# Patient Record
Sex: MALE | Race: WHITE
[De-identification: names, ages, dates, MRNs, and addresses within clinical notes are randomized per-mention and may not be internally consistent; named-entity substitution may affect disease eponyms.]

---

## 2018-06-01 ENCOUNTER — HOSPITAL ENCOUNTER (EMERGENCY)
Dept: HOSPITAL 8 - ED | Age: 58
Discharge: HOME | End: 2018-06-01
Payer: COMMERCIAL

## 2018-06-01 VITALS — HEIGHT: 68 IN | BODY MASS INDEX: 27.16 KG/M2 | WEIGHT: 179.24 LBS

## 2018-06-01 VITALS — DIASTOLIC BLOOD PRESSURE: 79 MMHG | SYSTOLIC BLOOD PRESSURE: 156 MMHG

## 2018-06-01 DIAGNOSIS — T18.128A: Primary | ICD-10-CM

## 2018-06-01 PROCEDURE — 88305 TISSUE EXAM BY PATHOLOGIST: CPT

## 2018-06-01 PROCEDURE — 43247 EGD REMOVE FOREIGN BODY: CPT

## 2018-06-01 PROCEDURE — 99285 EMERGENCY DEPT VISIT HI MDM: CPT

## 2018-06-01 PROCEDURE — 96374 THER/PROPH/DIAG INJ IV PUSH: CPT

## 2023-08-10 ENCOUNTER — OFFICE VISIT (OUTPATIENT)
Dept: MEDICAL GROUP | Facility: PHYSICIAN GROUP | Age: 63
End: 2023-08-10
Payer: COMMERCIAL

## 2023-08-10 VITALS
SYSTOLIC BLOOD PRESSURE: 174 MMHG | TEMPERATURE: 98.2 F | WEIGHT: 200.4 LBS | HEIGHT: 68 IN | BODY MASS INDEX: 30.37 KG/M2 | DIASTOLIC BLOOD PRESSURE: 100 MMHG | HEART RATE: 107 BPM

## 2023-08-10 DIAGNOSIS — R19.7 DIARRHEA, UNSPECIFIED TYPE: ICD-10-CM

## 2023-08-10 DIAGNOSIS — R53.83 OTHER FATIGUE: ICD-10-CM

## 2023-08-10 DIAGNOSIS — I10 PRIMARY HYPERTENSION: ICD-10-CM

## 2023-08-10 DIAGNOSIS — R63.0 POOR APPETITE: ICD-10-CM

## 2023-08-10 DIAGNOSIS — R11.0 NAUSEA: ICD-10-CM

## 2023-08-10 DIAGNOSIS — Z00.00 ENCOUNTER FOR MEDICAL EXAMINATION TO ESTABLISH CARE: ICD-10-CM

## 2023-08-10 DIAGNOSIS — Z12.11 ENCOUNTER FOR SCREENING FOR MALIGNANT NEOPLASM OF COLON: ICD-10-CM

## 2023-08-10 DIAGNOSIS — E66.9 OBESITY (BMI 30-39.9): ICD-10-CM

## 2023-08-10 DIAGNOSIS — Z11.59 ENCOUNTER FOR HEPATITIS C SCREENING TEST FOR LOW RISK PATIENT: ICD-10-CM

## 2023-08-10 DIAGNOSIS — Z23 NEED FOR VACCINATION: ICD-10-CM

## 2023-08-10 DIAGNOSIS — R14.0 BLOATED ABDOMEN: ICD-10-CM

## 2023-08-10 PROCEDURE — 3077F SYST BP >= 140 MM HG: CPT

## 2023-08-10 PROCEDURE — 90471 IMMUNIZATION ADMIN: CPT

## 2023-08-10 PROCEDURE — 90715 TDAP VACCINE 7 YRS/> IM: CPT

## 2023-08-10 PROCEDURE — 99204 OFFICE O/P NEW MOD 45 MIN: CPT | Mod: 25

## 2023-08-10 PROCEDURE — 3080F DIAST BP >= 90 MM HG: CPT

## 2023-08-10 RX ORDER — DICYCLOMINE HYDROCHLORIDE 10 MG/1
10 CAPSULE ORAL
Qty: 120 CAPSULE | Refills: 0 | Status: SHIPPED | OUTPATIENT
Start: 2023-08-10 | End: 2023-09-07

## 2023-08-10 RX ORDER — AMLODIPINE BESYLATE 5 MG/1
5 TABLET ORAL DAILY
Qty: 90 TABLET | Refills: 0 | Status: SHIPPED | OUTPATIENT
Start: 2023-08-10 | End: 2023-12-14 | Stop reason: SDUPTHER

## 2023-08-10 ASSESSMENT — PATIENT HEALTH QUESTIONNAIRE - PHQ9
CLINICAL INTERPRETATION OF PHQ2 SCORE: 5
5. POOR APPETITE OR OVEREATING: 3 - NEARLY EVERY DAY
SUM OF ALL RESPONSES TO PHQ QUESTIONS 1-9: 12

## 2023-08-10 NOTE — PROGRESS NOTES
"Subjective:     CC:    Chief Complaint   Patient presents with    Diarrhea     X6-8 months     Bloated     X6-8 months     Fatigue     X6-8 months    Nausea     HISTORY OF THE PRESENT ILLNESS: Morales is a pleasant 62 y.o. male here today to establish care and discuss 6-8 months of GI symptoms including diarrhea, bloating , nausea and fatigue.   As a result, patient states he doesn't have an appetite. He has early satiety. He feels he has to practically force feed himself. He denies weight loss.   On average, he has about 4 episodes of diarrhea every day.  He denies abdominal pain or burning.  He does not have any improvement in symptoms when he eats.    He feels he has no energy.  He does not feel he's drinking enough water.    He does drink alcohol, about 4 drinks of whiskey and 7-up a day.  He chews tobacco.    Health Maintenance: Completed    ROS:   All systems negative except as addressed in assessment and plan.     Objective:     Exam: BP (!) 174/100 (BP Location: Left arm, Patient Position: Sitting, BP Cuff Size: Adult)   Pulse (!) 107   Temp 36.8 °C (98.2 °F) (Temporal)   Ht 1.727 m (5' 8\")   Wt 90.9 kg (200 lb 6.4 oz)  Body mass index is 30.47 kg/m².    Physical Exam  Cardiovascular:      Rate and Rhythm: Tachycardia present.   Pulmonary:      Effort: Pulmonary effort is normal.      Breath sounds: Normal breath sounds.   Abdominal:      General: There is distension.   Musculoskeletal:         General: Normal range of motion.   Skin:     Comments: +Flushed skin       Labs: No labs on file to review      Assessment & Plan:   62 y.o. male with the following -    1. Encounter for medical examination to establish care  Health conditions and medications reviewed and updated. All screenings discussed and up-to-date. Health maintenance completed.     - TSH WITH REFLEX TO FT4; Future  - VITAMIN D,25 HYDROXY (DEFICIENCY); Future  - Lipid Profile; Future  - Comp Metabolic Panel; Future  - CBC WITH DIFFERENTIAL; " Future  - CBC WITHOUT DIFFERENTIAL; Future  - VITAMIN D,25 HYDROXY (DEFICIENCY); Future    2. Other fatigue  3. Diarrhea, unspecified type  4. Nausea  5. Bloated abdomen  6. Poor appetite  This is a new problem to examiner, chronic to patient.  Patient does admit to being a heavy drinker.  Will review labs as well as stool studies.  Referral also placed to GI for upper and lower scope.  Will order dicyclomine for symptomatic relief.  Patient to return in 2 weeks for follow-up.  - TSH WITH REFLEX TO FT4; Future  - VITAMIN D,25 HYDROXY (DEFICIENCY); Future  - CBC WITHOUT DIFFERENTIAL; Future  - HIV AG/AB COMBO ASSAY SCREENING; Future  - VITAMIN B12; Future  - FERRITIN; Future  - IRON/TOTAL IRON BIND; Future  - CELIAC DISEASE AB PANEL; Future  - Sed Rate; Future  - CRP QUANTITIVE (NON-CARDIAC); Future  - HEMOGLOBIN A1C; Future  - VITAMIN D,25 HYDROXY (DEFICIENCY); Future    7. Obesity (BMI 30-39.9)  - TSH WITH REFLEX TO FT4; Future  - Lipid Profile; Future  - Comp Metabolic Panel; Future  - HEMOGLOBIN A1C; Future    8. Primary hypertension  New problem.    Blood pressure in clinic is 174/100.  Will start patient on amlodipine.  He does have tachycardia with a heart rate of 107 as well today.  Will consider adding a beta-blocker at next visit if this continues to be problematic.  - amLODIPine (NORVASC) 5 MG Tab; Take 1 Tablet by mouth every day.  Dispense: 90 Tablet; Refill: 0    9. Encounter for hepatitis C screening test for low risk patient  - HEP C VIRUS ANTIBODY; Future    10. Encounter for screening for malignant neoplasm of colon  - Referral to GI for Colonoscopy    11. Need for vaccination  - Zoster Vac Recomb Adjuvanted (SHINGRIX) 50 MCG/0.5ML Recon Susp; Inject 0.5 mL into the shoulder, thigh, or buttocks one time for 1 dose.  Dispense: 0.5 mL; Refill: 0  - Tdap Vaccine =>6YO IM    Patient was educated in proper administration of medication(s) ordered today including safety, possible SE, risks, benefits,  rationale and alternatives to therapy.   Supportive care, differential diagnoses, and indications for immediate follow-up discussed with patient.    Pathogenesis of diagnosis discussed including typical length and natural progression.    Instructed to return to clinic or nearest emergency department for any change in condition, further concerns, or worsening of symptoms.  Patient states understanding of the plan of care and discharge instructions.    Return in about 2 weeks (around 8/24/2023) for Follow-up BP, Lab Review.    I spent a total of 42 minutes with record review, exam, and communication with the patient, communication with other providers, and documentation of this encounter. This does not include time spent on separately billable procedures/tests.    I have placed the above orders and discussed them with an approved delegating provider.  The MA is performing the below orders under the direction of Dr. Calvin.    Please note that this dictation was created using voice recognition software. I have worked with consultants from the vendor as well as technical experts from [facility] to optimize the interface. I have made every reasonable attempt to correct obvious errors, but I expect that there are errors of grammar and possibly content that I did not discover before finalizing the note.

## 2023-08-15 ENCOUNTER — HOSPITAL ENCOUNTER (OUTPATIENT)
Dept: LAB | Facility: MEDICAL CENTER | Age: 63
End: 2023-08-15
Payer: COMMERCIAL

## 2023-08-15 DIAGNOSIS — Z00.00 ENCOUNTER FOR MEDICAL EXAMINATION TO ESTABLISH CARE: ICD-10-CM

## 2023-08-15 DIAGNOSIS — R14.0 BLOATED ABDOMEN: ICD-10-CM

## 2023-08-15 DIAGNOSIS — R19.7 DIARRHEA, UNSPECIFIED TYPE: ICD-10-CM

## 2023-08-15 DIAGNOSIS — R11.0 NAUSEA: ICD-10-CM

## 2023-08-15 DIAGNOSIS — E66.9 OBESITY (BMI 30-39.9): ICD-10-CM

## 2023-08-15 DIAGNOSIS — R53.83 OTHER FATIGUE: ICD-10-CM

## 2023-08-15 DIAGNOSIS — Z11.59 ENCOUNTER FOR HEPATITIS C SCREENING TEST FOR LOW RISK PATIENT: ICD-10-CM

## 2023-08-15 LAB
25(OH)D3 SERPL-MCNC: 17 NG/ML (ref 30–100)
ALBUMIN SERPL BCP-MCNC: 4.6 G/DL (ref 3.2–4.9)
ALBUMIN/GLOB SERPL: 1.3 G/DL
ALP SERPL-CCNC: 138 U/L (ref 30–99)
ALT SERPL-CCNC: 102 U/L (ref 2–50)
ANION GAP SERPL CALC-SCNC: 14 MMOL/L (ref 7–16)
AST SERPL-CCNC: 190 U/L (ref 12–45)
BASOPHILS # BLD AUTO: 0.8 % (ref 0–1.8)
BASOPHILS # BLD: 0.06 K/UL (ref 0–0.12)
BILIRUB SERPL-MCNC: 1.2 MG/DL (ref 0.1–1.5)
BUN SERPL-MCNC: 6 MG/DL (ref 8–22)
CALCIUM ALBUM COR SERPL-MCNC: 8.8 MG/DL (ref 8.5–10.5)
CALCIUM SERPL-MCNC: 9.3 MG/DL (ref 8.5–10.5)
CHLORIDE SERPL-SCNC: 101 MMOL/L (ref 96–112)
CHOLEST SERPL-MCNC: 230 MG/DL (ref 100–199)
CO2 SERPL-SCNC: 24 MMOL/L (ref 20–33)
CREAT SERPL-MCNC: 0.64 MG/DL (ref 0.5–1.4)
CRP SERPL HS-MCNC: 1.38 MG/DL (ref 0–0.75)
EOSINOPHIL # BLD AUTO: 0.23 K/UL (ref 0–0.51)
EOSINOPHIL NFR BLD: 3.2 % (ref 0–6.9)
ERYTHROCYTE [DISTWIDTH] IN BLOOD BY AUTOMATED COUNT: 49.1 FL (ref 35.9–50)
ERYTHROCYTE [SEDIMENTATION RATE] IN BLOOD BY WESTERGREN METHOD: 11 MM/HOUR (ref 0–20)
EST. AVERAGE GLUCOSE BLD GHB EST-MCNC: 91 MG/DL
FERRITIN SERPL-MCNC: 1073 NG/ML (ref 22–322)
GFR SERPLBLD CREATININE-BSD FMLA CKD-EPI: 107 ML/MIN/1.73 M 2
GLOBULIN SER CALC-MCNC: 3.5 G/DL (ref 1.9–3.5)
GLUCOSE SERPL-MCNC: 109 MG/DL (ref 65–99)
H PYLORI AG STL QL IA: NOT DETECTED
HBA1C MFR BLD: 4.8 % (ref 4–5.6)
HCT VFR BLD AUTO: 45.5 % (ref 42–52)
HCV AB SER QL: NORMAL
HDLC SERPL-MCNC: 43 MG/DL
HGB BLD-MCNC: 16.4 G/DL (ref 14–18)
HIV 1+2 AB+HIV1 P24 AG SERPL QL IA: NORMAL
IMM GRANULOCYTES # BLD AUTO: 0.06 K/UL (ref 0–0.11)
IMM GRANULOCYTES NFR BLD AUTO: 0.8 % (ref 0–0.9)
IRON SATN MFR SERPL: 48 % (ref 15–55)
IRON SERPL-MCNC: 127 UG/DL (ref 50–180)
LDLC SERPL CALC-MCNC: 156 MG/DL
LYMPHOCYTES # BLD AUTO: 1.26 K/UL (ref 1–4.8)
LYMPHOCYTES NFR BLD: 17.7 % (ref 22–41)
MCH RBC QN AUTO: 35.8 PG (ref 27–33)
MCHC RBC AUTO-ENTMCNC: 36 G/DL (ref 32.3–36.5)
MCV RBC AUTO: 99.3 FL (ref 81.4–97.8)
MONOCYTES # BLD AUTO: 0.67 K/UL (ref 0–0.85)
MONOCYTES NFR BLD AUTO: 9.4 % (ref 0–13.4)
NEUTROPHILS # BLD AUTO: 4.85 K/UL (ref 1.82–7.42)
NEUTROPHILS NFR BLD: 68.1 % (ref 44–72)
NRBC # BLD AUTO: 0 K/UL
NRBC BLD-RTO: 0 /100 WBC (ref 0–0.2)
PLATELET # BLD AUTO: 119 K/UL (ref 164–446)
PMV BLD AUTO: 11.7 FL (ref 9–12.9)
POTASSIUM SERPL-SCNC: 3.2 MMOL/L (ref 3.6–5.5)
PROT SERPL-MCNC: 8.1 G/DL (ref 6–8.2)
RBC # BLD AUTO: 4.58 M/UL (ref 4.7–6.1)
SODIUM SERPL-SCNC: 139 MMOL/L (ref 135–145)
TIBC SERPL-MCNC: 264 UG/DL (ref 250–450)
TRIGL SERPL-MCNC: 154 MG/DL (ref 0–149)
TSH SERPL DL<=0.005 MIU/L-ACNC: 2.27 UIU/ML (ref 0.38–5.33)
UIBC SERPL-MCNC: 137 UG/DL (ref 110–370)
VIT B12 SERPL-MCNC: 831 PG/ML (ref 211–911)
WBC # BLD AUTO: 7.1 K/UL (ref 4.8–10.8)

## 2023-08-15 PROCEDURE — 36415 COLL VENOUS BLD VENIPUNCTURE: CPT

## 2023-08-15 PROCEDURE — 82728 ASSAY OF FERRITIN: CPT

## 2023-08-15 PROCEDURE — 82784 ASSAY IGA/IGD/IGG/IGM EACH: CPT

## 2023-08-15 PROCEDURE — 86364 TISS TRNSGLTMNASE EA IG CLAS: CPT

## 2023-08-15 PROCEDURE — 83036 HEMOGLOBIN GLYCOSYLATED A1C: CPT

## 2023-08-15 PROCEDURE — 80053 COMPREHEN METABOLIC PANEL: CPT

## 2023-08-15 PROCEDURE — 87338 HPYLORI STOOL AG IA: CPT

## 2023-08-15 PROCEDURE — 84443 ASSAY THYROID STIM HORMONE: CPT

## 2023-08-15 PROCEDURE — 87389 HIV-1 AG W/HIV-1&-2 AB AG IA: CPT

## 2023-08-15 PROCEDURE — 82607 VITAMIN B-12: CPT

## 2023-08-15 PROCEDURE — 85652 RBC SED RATE AUTOMATED: CPT

## 2023-08-15 PROCEDURE — 85025 COMPLETE CBC W/AUTO DIFF WBC: CPT

## 2023-08-15 PROCEDURE — 83550 IRON BINDING TEST: CPT

## 2023-08-15 PROCEDURE — 86140 C-REACTIVE PROTEIN: CPT

## 2023-08-15 PROCEDURE — 82306 VITAMIN D 25 HYDROXY: CPT

## 2023-08-15 PROCEDURE — 86803 HEPATITIS C AB TEST: CPT

## 2023-08-15 PROCEDURE — 83540 ASSAY OF IRON: CPT

## 2023-08-15 PROCEDURE — 80061 LIPID PANEL: CPT

## 2023-08-17 LAB
IGA SERPL-MCNC: 533 MG/DL (ref 68–408)
TTG IGA SER IA-ACNC: 2 U/ML (ref 0–3)

## 2023-09-01 ENCOUNTER — OFFICE VISIT (OUTPATIENT)
Dept: MEDICAL GROUP | Facility: PHYSICIAN GROUP | Age: 63
End: 2023-09-01
Payer: COMMERCIAL

## 2023-09-01 VITALS
HEIGHT: 68 IN | HEART RATE: 108 BPM | WEIGHT: 200.6 LBS | DIASTOLIC BLOOD PRESSURE: 94 MMHG | OXYGEN SATURATION: 96 % | SYSTOLIC BLOOD PRESSURE: 142 MMHG | BODY MASS INDEX: 30.4 KG/M2 | TEMPERATURE: 97.2 F

## 2023-09-01 DIAGNOSIS — F10.29 ALCOHOL DEPENDENCE WITH UNSPECIFIED ALCOHOL-INDUCED DISORDER (HCC): ICD-10-CM

## 2023-09-01 DIAGNOSIS — E55.9 VITAMIN D DEFICIENCY: ICD-10-CM

## 2023-09-01 DIAGNOSIS — R00.0 TACHYCARDIA: ICD-10-CM

## 2023-09-01 DIAGNOSIS — I10 PRIMARY HYPERTENSION: ICD-10-CM

## 2023-09-01 DIAGNOSIS — R63.0 POOR APPETITE: ICD-10-CM

## 2023-09-01 DIAGNOSIS — R79.89 ELEVATED LFTS: ICD-10-CM

## 2023-09-01 DIAGNOSIS — R19.7 DIARRHEA, UNSPECIFIED TYPE: ICD-10-CM

## 2023-09-01 DIAGNOSIS — R71.8 ELEVATED MCV: ICD-10-CM

## 2023-09-01 DIAGNOSIS — E87.6 HYPOKALEMIA: ICD-10-CM

## 2023-09-01 DIAGNOSIS — E78.2 MIXED HYPERLIPIDEMIA: ICD-10-CM

## 2023-09-01 DIAGNOSIS — R76.8 ELEVATED IMMUNOGLOBULIN A: ICD-10-CM

## 2023-09-01 DIAGNOSIS — R14.0 BLOATING: ICD-10-CM

## 2023-09-01 DIAGNOSIS — R79.89 ELEVATED FERRITIN: ICD-10-CM

## 2023-09-01 PROBLEM — E78.00 PURE HYPERCHOLESTEROLEMIA: Status: ACTIVE | Noted: 2023-09-01

## 2023-09-01 PROCEDURE — 3080F DIAST BP >= 90 MM HG: CPT

## 2023-09-01 PROCEDURE — 99417 PROLNG OP E/M EACH 15 MIN: CPT

## 2023-09-01 PROCEDURE — 3077F SYST BP >= 140 MM HG: CPT

## 2023-09-01 PROCEDURE — 99215 OFFICE O/P EST HI 40 MIN: CPT

## 2023-09-01 RX ORDER — POTASSIUM CHLORIDE 20 MEQ/1
20 TABLET, EXTENDED RELEASE ORAL 2 TIMES DAILY
Qty: 60 TABLET | Refills: 11 | Status: SHIPPED | OUTPATIENT
Start: 2023-09-01 | End: 2023-12-14 | Stop reason: SDUPTHER

## 2023-09-01 RX ORDER — FERROUS SULFATE 325(65) MG
5000 TABLET ORAL DAILY
Qty: 365 CAPSULE | Refills: 0 | Status: SHIPPED | OUTPATIENT
Start: 2023-09-01

## 2023-09-01 RX ORDER — METOPROLOL SUCCINATE 50 MG/1
50 TABLET, EXTENDED RELEASE ORAL DAILY
Qty: 90 TABLET | Refills: 0 | Status: SHIPPED | OUTPATIENT
Start: 2023-09-01 | End: 2023-12-14 | Stop reason: SDUPTHER

## 2023-09-01 ASSESSMENT — FIBROSIS 4 INDEX: FIB4 SCORE: 9.64

## 2023-09-01 NOTE — PROGRESS NOTES
"Subjective:     CC:   Chief Complaint   Patient presents with    Lab Results     HISTORY OF THE PRESENT ILLNESS: Morales is a pleasant 62 y.o. male here today to discuss lab results. Overall, patient has what appears to be inflammation of the liver or liver disease secondary to his chronic alcohol use.    He additionally has hypokalemia with chronic diarrhea.    Problem   Mixed Hyperlipidemia   Elevated Lfts   Alcohol Dependence With Unspecified Alcohol-Induced Disorder (Hcc)    Patient drinks about half of a 1.5 bottle of whiskey a day.  He states he knows he needs to cut back.     Poor Appetite    Patient states he has early satiety and just doesn't eat very much. This is not new and states it's likely secondary to his alcohol use.     Primary Hypertension    Patient was started on amlodipine at her last visit for elevated blood pressures.  He returns today stating he is tolerating it well.    He doesn't monitor his blood pressure at home.       Health Maintenance: Completed    ROS:  All systems negative expect as addressed in assessment and plan.     Objective:     Exam:  BP (!) 142/94 (BP Location: Left arm, Patient Position: Sitting, BP Cuff Size: Adult)   Pulse (!) 108   Temp 36.2 °C (97.2 °F) (Temporal)   Ht 1.727 m (5' 8\")   Wt 91 kg (200 lb 9.6 oz)   SpO2 96%   BMI 30.50 kg/m²  Body mass index is 30.5 kg/m².    Physical Exam  Constitutional:       Appearance: Normal appearance.   Cardiovascular:      Rate and Rhythm: Tachycardia present.   Pulmonary:      Effort: Pulmonary effort is normal.   Abdominal:      General: There is distension.   Skin:     Comments: +Flushed face (no jaundice)   Neurological:      Mental Status: He is alert. Mental status is at baseline.   Psychiatric:         Mood and Affect: Mood normal.         Behavior: Behavior normal.       Labs: Results reviewed from 08/15/23    Assessment & Plan:     62 y.o. male with the following -    1. Elevated LFTs  2. Elevated ferritin  3. " Elevated MCV  4. Elevated immunoglobulin A  5. Alcohol dependence with unspecified alcohol-induced disorder (HCC)  New problem on labs.  Elevated ferritin is likely in an acute phase reactant to liver dysfunction.  Immunoglobulin and stat C-reactive protein also indicative of inflammation or liver disease.  Patient encouraged to take steps to cut back on alcohol.  He says he knows he needs to but is not sure if he is ready.  We will obtain CT of abdomen to better assess liver.  Referral placed to GI.     Latest Reference Range & Units 08/15/23 08:49   Ferritin 22.0 - 322.0 ng/mL 1073.0 (H)   Immunoglobulin A 68 - 408 mg/dL 533 (H)      Latest Reference Range & Units 08/15/23 08:49   AST(SGOT) 12 - 45 U/L 190 (H)   ALT(SGPT) 2 - 50 U/L 102 (H)   Alkaline Phosphatase 30 - 99 U/L 138 (H)      Latest Reference Range & Units 08/15/23 08:49   Stat C-Reactive Protein 0.00 - 0.75 mg/dL 1.38 (H)     - Referral to Gastroenterology  - CT-ABDOMEN W/O; Future      5. Mixed hyperlipidemia  New problem.  The 10-year ASCVD risk score ([place] DK, et al., 2019) is: 24.2%  Not started on a statin today due to elevated liver enzymes.  Ezetimibe also contraindicated due to liver dysfunction.  Patient educated on diet and lifestyle modifications.   Latest Reference Range & Units 08/15/23 08:49   Cholesterol,Tot 100 - 199 mg/dL 230 (H)   Triglycerides 0 - 149 mg/dL 154 (H)   HDL >=40 mg/dL 43   LDL <100 mg/dL 156 (H)     - Referral to Gastroenterology    6. Primary hypertension  7. Tachycardia  Chronic, not controlled.  Will add metoprolol to help with ST as well as additional BP support.  Continue Amlodipine 5 mg daily.  - metoprolol SR (TOPROL XL) 50 MG TABLET SR 24 HR; Take 1 Tablet by mouth every day.  Dispense: 90 Tablet; Refill: 0    8. Hypokalemia  Likely secondary to diarrhea.  -Replace K+ with 20 meq BID   Latest Reference Range & Units 08/15/23 08:49   Potassium 3.6 - 5.5 mmol/L 3.2 (L)   (L): Data is abnormally low  - potassium  chloride SA (KDUR) 20 MEQ Tab CR; Take 1 Tablet by mouth 2 times a day.  Dispense: 60 Tablet; Refill: 11      10. Poor appetite  11. Bloating  12. Diarrhea, unspecified type  Continue with Dicyclomine  - CT-ABDOMEN W/O; Future  -Referral to Gastroenterology    13. Vitamin D deficiency  New problem detected on labs.  Encouraged patient to start OTC supplementation of Vitamin D 5,000 units daily.   Latest Reference Range & Units 08/15/23 08:49   25-Hydroxy   Vitamin D 25 30 - 100 ng/mL 17 (L)     Patient was educated in proper administration of medication(s) ordered today including safety, possible SE, risks, benefits, rationale and alternatives to therapy.   Supportive care, differential diagnoses, and indications for immediate follow-up discussed with patient.    Pathogenesis of diagnosis discussed including typical length and natural progression.    Instructed to return to clinic or nearest emergency department for any change in condition, further concerns, or worsening of symptoms.  Patient states understanding of the plan of care and discharge instructions.    Return for Establish with new PCP.    I spent a total of 60 minutes with record review, exam, and communication with the patient, communication with other providers, and documentation of this encounter. This does not include time spent on separately billable procedures/tests.    I have placed the above orders and discussed them with an approved delegating provider.  The MA is performing the below orders under the direction of Dr. Baker.    Please note that this dictation was created using voice recognition software. I have worked with consultants from the vendor as well as technical experts from [facility] to optimize the interface. I have made every reasonable attempt to correct obvious errors, but I expect that there are errors of grammar and possibly content that I did not discover before finalizing the note.

## 2023-09-01 NOTE — ASSESSMENT & PLAN NOTE
New problem detected on labs.  The 10-year ASCVD risk score (Javier WILKES, et al., 2019) is: 21%  Statin as well as ezetimibe are contraindicated due to lipid levels.   Patient counseled on diet and lifestyle modification.     Latest Reference Range & Units 08/15/23 08:49   Cholesterol,Tot 100 - 199 mg/dL 230 (H)   Triglycerides 0 - 149 mg/dL 154 (H)   HDL >=40 mg/dL 43   LDL <100 mg/dL 156 (H)   (H): Data is abnormally high

## 2023-09-01 NOTE — ASSESSMENT & PLAN NOTE
Chronic, not controlled.  Will add metoprolol to help with ST as well as additional BP support.  Continue Amlodipine 5 mg daily.

## 2023-09-06 DIAGNOSIS — R14.0 BLOATED ABDOMEN: ICD-10-CM

## 2023-09-06 DIAGNOSIS — R19.7 DIARRHEA, UNSPECIFIED TYPE: ICD-10-CM

## 2023-09-07 RX ORDER — DICYCLOMINE HYDROCHLORIDE 10 MG/1
10 CAPSULE ORAL
Qty: 120 CAPSULE | Refills: 3 | Status: SHIPPED | OUTPATIENT
Start: 2023-09-07

## 2023-09-22 ENCOUNTER — HOSPITAL ENCOUNTER (OUTPATIENT)
Dept: RADIOLOGY | Facility: MEDICAL CENTER | Age: 63
End: 2023-09-22
Payer: COMMERCIAL

## 2023-09-22 DIAGNOSIS — R79.89 ELEVATED LFTS: ICD-10-CM

## 2023-09-22 DIAGNOSIS — R63.0 POOR APPETITE: ICD-10-CM

## 2023-09-22 DIAGNOSIS — R79.89 ELEVATED FERRITIN: ICD-10-CM

## 2023-09-22 DIAGNOSIS — R19.7 DIARRHEA, UNSPECIFIED TYPE: ICD-10-CM

## 2023-09-22 PROCEDURE — 74150 CT ABDOMEN W/O CONTRAST: CPT

## 2023-10-02 ENCOUNTER — OFFICE VISIT (OUTPATIENT)
Dept: MEDICAL GROUP | Facility: PHYSICIAN GROUP | Age: 63
End: 2023-10-02
Payer: COMMERCIAL

## 2023-10-02 ENCOUNTER — HOSPITAL ENCOUNTER (INPATIENT)
Facility: MEDICAL CENTER | Age: 63
LOS: 1 days | DRG: 897 | End: 2023-10-03
Attending: STUDENT IN AN ORGANIZED HEALTH CARE EDUCATION/TRAINING PROGRAM | Admitting: STUDENT IN AN ORGANIZED HEALTH CARE EDUCATION/TRAINING PROGRAM
Payer: COMMERCIAL

## 2023-10-02 ENCOUNTER — APPOINTMENT (OUTPATIENT)
Dept: RADIOLOGY | Facility: MEDICAL CENTER | Age: 63
DRG: 897 | End: 2023-10-02
Attending: STUDENT IN AN ORGANIZED HEALTH CARE EDUCATION/TRAINING PROGRAM
Payer: COMMERCIAL

## 2023-10-02 VITALS
TEMPERATURE: 99.4 F | OXYGEN SATURATION: 96 % | BODY MASS INDEX: 30.01 KG/M2 | HEART RATE: 125 BPM | HEIGHT: 68 IN | SYSTOLIC BLOOD PRESSURE: 210 MMHG | WEIGHT: 198 LBS | DIASTOLIC BLOOD PRESSURE: 108 MMHG | RESPIRATION RATE: 14 BRPM

## 2023-10-02 DIAGNOSIS — R94.31 EKG ABNORMALITIES: ICD-10-CM

## 2023-10-02 DIAGNOSIS — I16.1 HYPERTENSIVE EMERGENCY: ICD-10-CM

## 2023-10-02 DIAGNOSIS — I16.0 HYPERTENSIVE URGENCY: ICD-10-CM

## 2023-10-02 DIAGNOSIS — J96.01 ACUTE RESPIRATORY FAILURE WITH HYPOXIA (HCC): ICD-10-CM

## 2023-10-02 DIAGNOSIS — I20.9 ANGINA PECTORIS (HCC): ICD-10-CM

## 2023-10-02 DIAGNOSIS — F10.931 ALCOHOL WITHDRAWAL DELIRIUM, ACUTE, HYPERACTIVE (HCC): ICD-10-CM

## 2023-10-02 DIAGNOSIS — R00.0 TACHYCARDIA: ICD-10-CM

## 2023-10-02 PROBLEM — E83.42 HYPOMAGNESEMIA: Status: ACTIVE | Noted: 2023-10-02

## 2023-10-02 PROBLEM — Z72.0 TOBACCO ABUSE: Status: ACTIVE | Noted: 2023-10-02

## 2023-10-02 LAB
ALBUMIN SERPL BCP-MCNC: 4.4 G/DL (ref 3.2–4.9)
ALBUMIN/GLOB SERPL: 1.2 G/DL
ALP SERPL-CCNC: 129 U/L (ref 30–99)
ALT SERPL-CCNC: 110 U/L (ref 2–50)
ANION GAP SERPL CALC-SCNC: 14 MMOL/L (ref 7–16)
AST SERPL-CCNC: 145 U/L (ref 12–45)
BASOPHILS # BLD AUTO: 0.5 % (ref 0–1.8)
BASOPHILS # BLD: 0.05 K/UL (ref 0–0.12)
BILIRUB SERPL-MCNC: 1.4 MG/DL (ref 0.1–1.5)
BUN SERPL-MCNC: 8 MG/DL (ref 8–22)
CALCIUM ALBUM COR SERPL-MCNC: 9.1 MG/DL (ref 8.5–10.5)
CALCIUM SERPL-MCNC: 9.4 MG/DL (ref 8.5–10.5)
CHLORIDE SERPL-SCNC: 105 MMOL/L (ref 96–112)
CO2 SERPL-SCNC: 23 MMOL/L (ref 20–33)
CORTIS SERPL-MCNC: 11.5 UG/DL (ref 0–23)
CREAT SERPL-MCNC: 0.61 MG/DL (ref 0.5–1.4)
EKG IMPRESSION: NORMAL
EKG IMPRESSION: NORMAL
EOSINOPHIL # BLD AUTO: 0.12 K/UL (ref 0–0.51)
EOSINOPHIL NFR BLD: 1.2 % (ref 0–6.9)
ERYTHROCYTE [DISTWIDTH] IN BLOOD BY AUTOMATED COUNT: 45.6 FL (ref 35.9–50)
GFR SERPLBLD CREATININE-BSD FMLA CKD-EPI: 109 ML/MIN/1.73 M 2
GLOBULIN SER CALC-MCNC: 3.7 G/DL (ref 1.9–3.5)
GLUCOSE SERPL-MCNC: 95 MG/DL (ref 65–99)
HCT VFR BLD AUTO: 46.1 % (ref 42–52)
HGB BLD-MCNC: 17.3 G/DL (ref 14–18)
IMM GRANULOCYTES # BLD AUTO: 0.08 K/UL (ref 0–0.11)
IMM GRANULOCYTES NFR BLD AUTO: 0.8 % (ref 0–0.9)
INR PPP: 1.16 (ref 0.87–1.13)
LYMPHOCYTES # BLD AUTO: 1.2 K/UL (ref 1–4.8)
LYMPHOCYTES NFR BLD: 12.4 % (ref 22–41)
MAGNESIUM SERPL-MCNC: 1.4 MG/DL (ref 1.5–2.5)
MCH RBC QN AUTO: 36.7 PG (ref 27–33)
MCHC RBC AUTO-ENTMCNC: 37.5 G/DL (ref 32.3–36.5)
MCV RBC AUTO: 97.7 FL (ref 81.4–97.8)
MONOCYTES # BLD AUTO: 0.78 K/UL (ref 0–0.85)
MONOCYTES NFR BLD AUTO: 8.1 % (ref 0–13.4)
NEUTROPHILS # BLD AUTO: 7.43 K/UL (ref 1.82–7.42)
NEUTROPHILS NFR BLD: 77 % (ref 44–72)
NRBC # BLD AUTO: 0 K/UL
NRBC BLD-RTO: 0 /100 WBC (ref 0–0.2)
NT-PROBNP SERPL IA-MCNC: 65 PG/ML (ref 0–125)
PHOSPHATE SERPL-MCNC: 2.5 MG/DL (ref 2.5–4.5)
PLATELET # BLD AUTO: 131 K/UL (ref 164–446)
PMV BLD AUTO: 11.5 FL (ref 9–12.9)
POTASSIUM SERPL-SCNC: 3.7 MMOL/L (ref 3.6–5.5)
PROCALCITONIN SERPL-MCNC: 0.1 NG/ML
PROT SERPL-MCNC: 8.1 G/DL (ref 6–8.2)
PROTHROMBIN TIME: 15 SEC (ref 12–14.6)
RBC # BLD AUTO: 4.72 M/UL (ref 4.7–6.1)
SODIUM SERPL-SCNC: 142 MMOL/L (ref 135–145)
TROPONIN T SERPL-MCNC: 101 NG/L (ref 6–19)
TROPONIN T SERPL-MCNC: 13 NG/L (ref 6–19)
TSH SERPL DL<=0.005 MIU/L-ACNC: 1.44 UIU/ML (ref 0.38–5.33)
WBC # BLD AUTO: 9.7 K/UL (ref 4.8–10.8)

## 2023-10-02 PROCEDURE — 99285 EMERGENCY DEPT VISIT HI MDM: CPT

## 2023-10-02 PROCEDURE — 3080F DIAST BP >= 90 MM HG: CPT | Performed by: FAMILY MEDICINE

## 2023-10-02 PROCEDURE — A9270 NON-COVERED ITEM OR SERVICE: HCPCS | Mod: JZ | Performed by: STUDENT IN AN ORGANIZED HEALTH CARE EDUCATION/TRAINING PROGRAM

## 2023-10-02 PROCEDURE — 99406 BEHAV CHNG SMOKING 3-10 MIN: CPT | Performed by: STUDENT IN AN ORGANIZED HEALTH CARE EDUCATION/TRAINING PROGRAM

## 2023-10-02 PROCEDURE — 80053 COMPREHEN METABOLIC PANEL: CPT

## 2023-10-02 PROCEDURE — 99223 1ST HOSP IP/OBS HIGH 75: CPT | Mod: 25 | Performed by: STUDENT IN AN ORGANIZED HEALTH CARE EDUCATION/TRAINING PROGRAM

## 2023-10-02 PROCEDURE — 93000 ELECTROCARDIOGRAM COMPLETE: CPT | Performed by: FAMILY MEDICINE

## 2023-10-02 PROCEDURE — 71045 X-RAY EXAM CHEST 1 VIEW: CPT

## 2023-10-02 PROCEDURE — 85025 COMPLETE CBC W/AUTO DIFF WBC: CPT

## 2023-10-02 PROCEDURE — 93005 ELECTROCARDIOGRAM TRACING: CPT | Performed by: STUDENT IN AN ORGANIZED HEALTH CARE EDUCATION/TRAINING PROGRAM

## 2023-10-02 PROCEDURE — 83735 ASSAY OF MAGNESIUM: CPT

## 2023-10-02 PROCEDURE — 99215 OFFICE O/P EST HI 40 MIN: CPT | Performed by: FAMILY MEDICINE

## 2023-10-02 PROCEDURE — 93005 ELECTROCARDIOGRAM TRACING: CPT

## 2023-10-02 PROCEDURE — 85610 PROTHROMBIN TIME: CPT

## 2023-10-02 PROCEDURE — 84484 ASSAY OF TROPONIN QUANT: CPT

## 2023-10-02 PROCEDURE — 99406 BEHAV CHNG SMOKING 3-10 MIN: CPT

## 2023-10-02 PROCEDURE — 84443 ASSAY THYROID STIM HORMONE: CPT

## 2023-10-02 PROCEDURE — 700102 HCHG RX REV CODE 250 W/ 637 OVERRIDE(OP): Mod: JZ | Performed by: STUDENT IN AN ORGANIZED HEALTH CARE EDUCATION/TRAINING PROGRAM

## 2023-10-02 PROCEDURE — 84100 ASSAY OF PHOSPHORUS: CPT

## 2023-10-02 PROCEDURE — 700111 HCHG RX REV CODE 636 W/ 250 OVERRIDE (IP): Performed by: STUDENT IN AN ORGANIZED HEALTH CARE EDUCATION/TRAINING PROGRAM

## 2023-10-02 PROCEDURE — 83880 ASSAY OF NATRIURETIC PEPTIDE: CPT

## 2023-10-02 PROCEDURE — 96366 THER/PROPH/DIAG IV INF ADDON: CPT

## 2023-10-02 PROCEDURE — 36415 COLL VENOUS BLD VENIPUNCTURE: CPT

## 2023-10-02 PROCEDURE — 700101 HCHG RX REV CODE 250: Performed by: STUDENT IN AN ORGANIZED HEALTH CARE EDUCATION/TRAINING PROGRAM

## 2023-10-02 PROCEDURE — 96375 TX/PRO/DX INJ NEW DRUG ADDON: CPT

## 2023-10-02 PROCEDURE — 3077F SYST BP >= 140 MM HG: CPT | Performed by: FAMILY MEDICINE

## 2023-10-02 PROCEDURE — 700105 HCHG RX REV CODE 258: Performed by: STUDENT IN AN ORGANIZED HEALTH CARE EDUCATION/TRAINING PROGRAM

## 2023-10-02 PROCEDURE — 84145 PROCALCITONIN (PCT): CPT

## 2023-10-02 PROCEDURE — 96365 THER/PROPH/DIAG IV INF INIT: CPT

## 2023-10-02 PROCEDURE — 770020 HCHG ROOM/CARE - TELE (206)

## 2023-10-02 PROCEDURE — 82533 TOTAL CORTISOL: CPT

## 2023-10-02 RX ORDER — PHENOBARBITAL SODIUM 130 MG/ML
260 INJECTION, SOLUTION INTRAMUSCULAR; INTRAVENOUS ONCE
Status: COMPLETED | OUTPATIENT
Start: 2023-10-02 | End: 2023-10-02

## 2023-10-02 RX ORDER — LORAZEPAM 2 MG/ML
2 INJECTION INTRAMUSCULAR
Status: DISCONTINUED | OUTPATIENT
Start: 2023-10-02 | End: 2023-10-03 | Stop reason: HOSPADM

## 2023-10-02 RX ORDER — CHLORDIAZEPOXIDE HYDROCHLORIDE 25 MG/1
50 CAPSULE, GELATIN COATED ORAL EVERY 6 HOURS
Status: DISCONTINUED | OUTPATIENT
Start: 2023-10-03 | End: 2023-10-03 | Stop reason: HOSPADM

## 2023-10-02 RX ORDER — LORAZEPAM 2 MG/ML
1.5 INJECTION INTRAMUSCULAR
Status: DISCONTINUED | OUTPATIENT
Start: 2023-10-02 | End: 2023-10-03 | Stop reason: HOSPADM

## 2023-10-02 RX ORDER — ONDANSETRON 2 MG/ML
4 INJECTION INTRAMUSCULAR; INTRAVENOUS EVERY 4 HOURS PRN
Status: DISCONTINUED | OUTPATIENT
Start: 2023-10-02 | End: 2023-10-03 | Stop reason: HOSPADM

## 2023-10-02 RX ORDER — CLONIDINE HYDROCHLORIDE 0.1 MG/1
0.1 TABLET ORAL
Status: DISCONTINUED | OUTPATIENT
Start: 2023-10-02 | End: 2023-10-03 | Stop reason: HOSPADM

## 2023-10-02 RX ORDER — LORAZEPAM 1 MG/1
1 TABLET ORAL EVERY 4 HOURS PRN
Status: DISCONTINUED | OUTPATIENT
Start: 2023-10-02 | End: 2023-10-03 | Stop reason: HOSPADM

## 2023-10-02 RX ORDER — LORAZEPAM 2 MG/ML
1 INJECTION INTRAMUSCULAR
Status: DISCONTINUED | OUTPATIENT
Start: 2023-10-02 | End: 2023-10-03 | Stop reason: HOSPADM

## 2023-10-02 RX ORDER — POTASSIUM CHLORIDE 20 MEQ/1
40 TABLET, EXTENDED RELEASE ORAL ONCE
Status: COMPLETED | OUTPATIENT
Start: 2023-10-02 | End: 2023-10-02

## 2023-10-02 RX ORDER — POLYETHYLENE GLYCOL 3350 17 G/17G
1 POWDER, FOR SOLUTION ORAL
Status: DISCONTINUED | OUTPATIENT
Start: 2023-10-02 | End: 2023-10-03 | Stop reason: HOSPADM

## 2023-10-02 RX ORDER — MAGNESIUM SULFATE HEPTAHYDRATE 40 MG/ML
2 INJECTION, SOLUTION INTRAVENOUS ONCE
Status: COMPLETED | OUTPATIENT
Start: 2023-10-02 | End: 2023-10-03

## 2023-10-02 RX ORDER — PANTOPRAZOLE SODIUM 40 MG/10ML
40 INJECTION, POWDER, LYOPHILIZED, FOR SOLUTION INTRAVENOUS 2 TIMES DAILY
Status: DISCONTINUED | OUTPATIENT
Start: 2023-10-03 | End: 2023-10-03

## 2023-10-02 RX ORDER — BISACODYL 10 MG
10 SUPPOSITORY, RECTAL RECTAL
Status: DISCONTINUED | OUTPATIENT
Start: 2023-10-02 | End: 2023-10-03 | Stop reason: HOSPADM

## 2023-10-02 RX ORDER — PROCHLORPERAZINE EDISYLATE 5 MG/ML
5-10 INJECTION INTRAMUSCULAR; INTRAVENOUS EVERY 4 HOURS PRN
Status: DISCONTINUED | OUTPATIENT
Start: 2023-10-02 | End: 2023-10-03 | Stop reason: HOSPADM

## 2023-10-02 RX ORDER — MAGNESIUM SULFATE HEPTAHYDRATE 40 MG/ML
4 INJECTION, SOLUTION INTRAVENOUS ONCE
Status: DISCONTINUED | OUTPATIENT
Start: 2023-10-02 | End: 2023-10-02

## 2023-10-02 RX ORDER — AMLODIPINE BESYLATE 5 MG/1
5 TABLET ORAL DAILY
Status: DISCONTINUED | OUTPATIENT
Start: 2023-10-03 | End: 2023-10-03 | Stop reason: HOSPADM

## 2023-10-02 RX ORDER — AMOXICILLIN 250 MG
2 CAPSULE ORAL 2 TIMES DAILY
Status: DISCONTINUED | OUTPATIENT
Start: 2023-10-03 | End: 2023-10-03 | Stop reason: HOSPADM

## 2023-10-02 RX ORDER — ONDANSETRON 4 MG/1
4 TABLET, ORALLY DISINTEGRATING ORAL EVERY 4 HOURS PRN
Status: DISCONTINUED | OUTPATIENT
Start: 2023-10-02 | End: 2023-10-03 | Stop reason: HOSPADM

## 2023-10-02 RX ORDER — FOLIC ACID 1 MG/1
1 TABLET ORAL DAILY
Status: DISCONTINUED | OUTPATIENT
Start: 2023-10-03 | End: 2023-10-03 | Stop reason: HOSPADM

## 2023-10-02 RX ORDER — PROMETHAZINE HYDROCHLORIDE 25 MG/1
12.5-25 SUPPOSITORY RECTAL EVERY 4 HOURS PRN
Status: DISCONTINUED | OUTPATIENT
Start: 2023-10-02 | End: 2023-10-03 | Stop reason: HOSPADM

## 2023-10-02 RX ORDER — LORAZEPAM 2 MG/1
4 TABLET ORAL
Status: DISCONTINUED | OUTPATIENT
Start: 2023-10-02 | End: 2023-10-03 | Stop reason: HOSPADM

## 2023-10-02 RX ORDER — LABETALOL HYDROCHLORIDE 5 MG/ML
10 INJECTION, SOLUTION INTRAVENOUS EVERY 4 HOURS PRN
Status: DISCONTINUED | OUTPATIENT
Start: 2023-10-02 | End: 2023-10-03 | Stop reason: HOSPADM

## 2023-10-02 RX ORDER — LORAZEPAM 2 MG/ML
0.5 INJECTION INTRAMUSCULAR EVERY 4 HOURS PRN
Status: DISCONTINUED | OUTPATIENT
Start: 2023-10-02 | End: 2023-10-03 | Stop reason: HOSPADM

## 2023-10-02 RX ORDER — CHLORDIAZEPOXIDE HYDROCHLORIDE 25 MG/1
25 CAPSULE, GELATIN COATED ORAL EVERY 6 HOURS
Status: DISCONTINUED | OUTPATIENT
Start: 2023-10-04 | End: 2023-10-03 | Stop reason: HOSPADM

## 2023-10-02 RX ORDER — MAGNESIUM SULFATE HEPTAHYDRATE 40 MG/ML
2 INJECTION, SOLUTION INTRAVENOUS ONCE
Status: COMPLETED | OUTPATIENT
Start: 2023-10-02 | End: 2023-10-02

## 2023-10-02 RX ORDER — LABETALOL HYDROCHLORIDE 5 MG/ML
10 INJECTION, SOLUTION INTRAVENOUS ONCE
Status: COMPLETED | OUTPATIENT
Start: 2023-10-02 | End: 2023-10-02

## 2023-10-02 RX ORDER — METOPROLOL SUCCINATE 50 MG/1
50 TABLET, EXTENDED RELEASE ORAL DAILY
Status: DISCONTINUED | OUTPATIENT
Start: 2023-10-03 | End: 2023-10-03 | Stop reason: HOSPADM

## 2023-10-02 RX ORDER — ENOXAPARIN SODIUM 100 MG/ML
40 INJECTION SUBCUTANEOUS DAILY
Status: DISCONTINUED | OUTPATIENT
Start: 2023-10-02 | End: 2023-10-03 | Stop reason: HOSPADM

## 2023-10-02 RX ORDER — LANOLIN ALCOHOL/MO/W.PET/CERES
400 CREAM (GRAM) TOPICAL 2 TIMES DAILY
Status: DISCONTINUED | OUTPATIENT
Start: 2023-10-03 | End: 2023-10-03 | Stop reason: HOSPADM

## 2023-10-02 RX ORDER — IPRATROPIUM BROMIDE AND ALBUTEROL SULFATE 2.5; .5 MG/3ML; MG/3ML
3 SOLUTION RESPIRATORY (INHALATION)
Status: DISCONTINUED | OUTPATIENT
Start: 2023-10-02 | End: 2023-10-03 | Stop reason: HOSPADM

## 2023-10-02 RX ORDER — PROMETHAZINE HYDROCHLORIDE 25 MG/1
12.5-25 TABLET ORAL EVERY 4 HOURS PRN
Status: DISCONTINUED | OUTPATIENT
Start: 2023-10-02 | End: 2023-10-03 | Stop reason: HOSPADM

## 2023-10-02 RX ORDER — LORAZEPAM 2 MG/ML
1 INJECTION INTRAMUSCULAR ONCE
Status: COMPLETED | OUTPATIENT
Start: 2023-10-02 | End: 2023-10-02

## 2023-10-02 RX ORDER — LORAZEPAM 0.5 MG/1
0.5 TABLET ORAL EVERY 4 HOURS PRN
Status: DISCONTINUED | OUTPATIENT
Start: 2023-10-02 | End: 2023-10-03 | Stop reason: HOSPADM

## 2023-10-02 RX ORDER — GAUZE BANDAGE 2" X 2"
100 BANDAGE TOPICAL DAILY
Status: DISCONTINUED | OUTPATIENT
Start: 2023-10-03 | End: 2023-10-03 | Stop reason: HOSPADM

## 2023-10-02 RX ORDER — LORAZEPAM 2 MG/1
2 TABLET ORAL
Status: DISCONTINUED | OUTPATIENT
Start: 2023-10-02 | End: 2023-10-03 | Stop reason: HOSPADM

## 2023-10-02 RX ORDER — SODIUM CHLORIDE, SODIUM LACTATE, POTASSIUM CHLORIDE, AND CALCIUM CHLORIDE .6; .31; .03; .02 G/100ML; G/100ML; G/100ML; G/100ML
500 INJECTION, SOLUTION INTRAVENOUS
Status: DISCONTINUED | OUTPATIENT
Start: 2023-10-02 | End: 2023-10-03 | Stop reason: HOSPADM

## 2023-10-02 RX ADMIN — FOLIC ACID: 5 INJECTION, SOLUTION INTRAMUSCULAR; INTRAVENOUS; SUBCUTANEOUS at 22:27

## 2023-10-02 RX ADMIN — LABETALOL HYDROCHLORIDE 10 MG: 5 INJECTION INTRAVENOUS at 17:12

## 2023-10-02 RX ADMIN — MAGNESIUM SULFATE HEPTAHYDRATE 2 G: 2 INJECTION, SOLUTION INTRAVENOUS at 23:35

## 2023-10-02 RX ADMIN — MAGNESIUM SULFATE HEPTAHYDRATE 2 G: 40 INJECTION, SOLUTION INTRAVENOUS at 18:13

## 2023-10-02 RX ADMIN — PHENOBARBITAL SODIUM 260 MG: 130 INJECTION INTRAMUSCULAR; INTRAVENOUS at 21:13

## 2023-10-02 RX ADMIN — ENOXAPARIN SODIUM 40 MG: 100 INJECTION SUBCUTANEOUS at 22:28

## 2023-10-02 RX ADMIN — LORAZEPAM 1 MG: 2 INJECTION INTRAMUSCULAR; INTRAVENOUS at 17:09

## 2023-10-02 RX ADMIN — CHLORDIAZEPOXIDE HYDROCHLORIDE 50 MG: 25 CAPSULE ORAL at 23:35

## 2023-10-02 RX ADMIN — POTASSIUM CHLORIDE 40 MEQ: 1500 TABLET, EXTENDED RELEASE ORAL at 22:41

## 2023-10-02 RX ADMIN — MAGNESIUM SULFATE HEPTAHYDRATE 4 G: 4 INJECTION, SOLUTION INTRAVENOUS at 22:00

## 2023-10-02 ASSESSMENT — LIFESTYLE VARIABLES
NAUSEA AND VOMITING: NO NAUSEA AND NO VOMITING
VISUAL DISTURBANCES: NOT PRESENT
HEADACHE, FULLNESS IN HEAD: NOT PRESENT
TOTAL SCORE: 1
HAVE PEOPLE ANNOYED YOU BY CRITICIZING YOUR DRINKING: NO
TOTAL SCORE: 0
ANXIETY: NO ANXIETY (AT EASE)
AVERAGE NUMBER OF DAYS PER WEEK YOU HAVE A DRINK CONTAINING ALCOHOL: 7
EVER FELT BAD OR GUILTY ABOUT YOUR DRINKING: NO
AGITATION: NORMAL ACTIVITY
ORIENTATION AND CLOUDING OF SENSORIUM: ORIENTED AND CAN DO SERIAL ADDITIONS
PAROXYSMAL SWEATS: NO SWEAT VISIBLE
SUBSTANCE_ABUSE: 0
EVER HAD A DRINK FIRST THING IN THE MORNING TO STEADY YOUR NERVES TO GET RID OF A HANGOVER: NO
ON A TYPICAL DAY WHEN YOU DRINK ALCOHOL HOW MANY DRINKS DO YOU HAVE: 3
TOTAL SCORE: 1
HAVE YOU EVER FELT YOU SHOULD CUT DOWN ON YOUR DRINKING: YES
ALCOHOL_USE: YES
TOTAL SCORE: 1
HOW MANY TIMES IN THE PAST YEAR HAVE YOU HAD 5 OR MORE DRINKS IN A DAY: 100
DOES PATIENT WANT TO TALK TO SOMEONE ABOUT QUITTING: YES
AUDITORY DISTURBANCES: NOT PRESENT
DOES PATIENT WANT TO STOP DRINKING: YES
CONSUMPTION TOTAL: POSITIVE
TREMOR: NO TREMOR

## 2023-10-02 ASSESSMENT — ENCOUNTER SYMPTOMS
MYALGIAS: 0
VOMITING: 1
BLURRED VISION: 0
COUGH: 0
DEPRESSION: 0
ABDOMINAL PAIN: 1
DIZZINESS: 0
HEARTBURN: 1
SHORTNESS OF BREATH: 0
TREMORS: 1
BRUISES/BLEEDS EASILY: 0
FEVER: 0
PALPITATIONS: 1
NAUSEA: 1
DOUBLE VISION: 0
HEADACHES: 0
CHILLS: 0

## 2023-10-02 ASSESSMENT — PAIN DESCRIPTION - PAIN TYPE: TYPE: ACUTE PAIN

## 2023-10-02 ASSESSMENT — COGNITIVE AND FUNCTIONAL STATUS - GENERAL
DAILY ACTIVITIY SCORE: 24
SUGGESTED CMS G CODE MODIFIER MOBILITY: CH
MOBILITY SCORE: 24
SUGGESTED CMS G CODE MODIFIER DAILY ACTIVITY: CH

## 2023-10-02 ASSESSMENT — FIBROSIS 4 INDEX
FIB4 SCORE: 9.64
FIB4 SCORE: 6.44
FIB4 SCORE: 9.64

## 2023-10-02 ASSESSMENT — PATIENT HEALTH QUESTIONNAIRE - PHQ9
2. FEELING DOWN, DEPRESSED, IRRITABLE, OR HOPELESS: NOT AT ALL
1. LITTLE INTEREST OR PLEASURE IN DOING THINGS: NOT AT ALL
SUM OF ALL RESPONSES TO PHQ9 QUESTIONS 1 AND 2: 0

## 2023-10-02 ASSESSMENT — COPD QUESTIONNAIRES
HAVE YOU SMOKED AT LEAST 100 CIGARETTES IN YOUR ENTIRE LIFE: YES
DURING THE PAST 4 WEEKS HOW MUCH DID YOU FEEL SHORT OF BREATH: NONE/LITTLE OF THE TIME
COPD SCREENING SCORE: 4
DO YOU EVER COUGH UP ANY MUCUS OR PHLEGM?: NO/ONLY WITH OCCASIONAL COLDS OR INFECTIONS

## 2023-10-02 NOTE — ED TRIAGE NOTES
"Chief Complaint   Patient presents with    Sent by MD     Pt was at his PCP and sent here for abnormal EKG.      Pt to triage with above complaints. Pt has history of HTN and states he has been compliant with medications. Pt denies any complaints of chest pain, shortness of breath, dizziness, or palpitations. R arm 188/93. L arm 189/100. Pt is breathing with even, unlabored respirations.     EKG ordered.    BP (!) 198/121   Pulse (!) 125   Temp 36.8 °C (98.2 °F) (Temporal)   Resp 18   Ht 1.702 m (5' 7\")   Wt 90.3 kg (199 lb 1.2 oz)   SpO2 96%   BMI 31.18 kg/m²     "

## 2023-10-02 NOTE — PROGRESS NOTES
"CHIEF COMPLAINT / REASON FOR VISIT  Morales Lopez is a 62 y.o. male that presents today to establish care.    HISTORY OF PRESENT ILLNESS  Here to establish care    Past Surgical History:   Procedure Laterality Date    LARYNGOSCOPY  1/25/2011    Performed by AMANDA TA at SURGERY SAME DAY [facility]    VOCAL CORD POLYP EXCISION  1/25/2011    Performed by AMANDA TA at SURGERY SAME DAY [facility]    CLAVICLE DISTAL EXCISION      from staph infection       Social History     Tobacco Use    Smoking status: Every Day    Smokeless tobacco: Never    Tobacco comments:     chews 1 can every 3 days   Vaping Use    Vaping Use: Never used   Substance Use Topics    Alcohol use: Yes     Alcohol/week: 14.4 oz     Types: 24 Shots of liquor per week    Drug use: No     OBJECTIVE    BP (!) 210/108 (BP Location: Right arm, Patient Position: Sitting, BP Cuff Size: Adult)   Pulse (!) 125   Temp 37.4 °C (99.4 °F) (Temporal)   Resp 14   Ht 1.727 m (5' 8\")   Wt 89.8 kg (198 lb)   SpO2 96%   BMI 30.11 kg/m²      PHYSICAL EXAM  Constitutional: Sitting comfortably, in no acute distress, responds to questions appropriately.  Head: Normocephalic  Eyes:  No conjunctival injection, no scleral icterus, PERRL  Heart: Tachycardic, regular S1 S2, no murmurs, rub, or gallops  Extremities: No lower extremity edema  Skin: Warm and dry    ASSESSMENT & PLAN  1. Hypertensive emergency  2. Tachycardia  Extremely hypertensive with blood pressure 210/108, EKG showing sinus tachycardia with T wave inversions in leads III, aVF, and V1.  Possible Q waves in III and V3.  Recommend immediate evaluation in emergency department to rule out endorgan damage and ischemic heart disease.  The patient's friend who accompanies him will take him directly to the emergency department.  - EKG - Clinic Performed    "

## 2023-10-02 NOTE — ED PROVIDER NOTES
ED Provider Note    CHIEF COMPLAINT  Chief Complaint   Patient presents with    Sent by MD     Pt was at his PCP and sent here for abnormal EKG.        EXTERNAL RECORDS REVIEWED  Other EKG from clinic   EKG: Sinus tachycardia, rate of 116, normal axis, borderline ST elevation in lead aVR, V1, diffuse ST depressions in the inferior and lateral leads.  Artifact in leads V3.  No priors available for comparison.    HPI/ROS  LIMITATION TO HISTORY   Select: : None  OUTSIDE HISTORIAN(S):  Family Spouse    Morales Lopez is a 62 y.o. male who presents with elevated  in clinic, denying CP, SOB, is on antihypertensive medications, was seen today for abnormal liver enzymes, was following up.  He reports he drinks about 1/5 of bourbon a night, no history of alcohol withdrawal, last drink was yesterday evening.  He denies blurred vision double vision, chest pain dyspnea, numbness, weakness dyspnea on exertion chest pain on exertion, history of MI.  Does have a family history of MI, he is a never smoker but does use chew tobacco and has used it for a longstanding time.  He is unsure what his blood pressure medications are he reports he missed them last week but took it this morning.  Patient per chart review takes metoprolol 50 every 24, and amlodipine 5 daily.    PAST MEDICAL HISTORY   has a past medical history of Snoring.    SURGICAL HISTORY   has a past surgical history that includes clavicle distal excision; laryngoscopy (1/25/2011); and vocal cord polyp excision (1/25/2011).    FAMILY HISTORY  No family history on file.    SOCIAL HISTORY  Social History     Tobacco Use    Smoking status: Every Day    Smokeless tobacco: Never    Tobacco comments:     chews 1 can every 3 days   Vaping Use    Vaping Use: Never used   Substance and Sexual Activity    Alcohol use: Yes     Alcohol/week: 14.4 oz     Types: 24 Shots of liquor per week    Drug use: No    Sexual activity: Not on file       CURRENT MEDICATIONS  Home  "Medications       Reviewed by Dinora Zafar PhT (Pharmacy Tech) on 10/02/23 at 2156  Med List Status: Complete     Medication Last Dose Status   amLODIPine (NORVASC) 5 MG Tab 10/2/2023 Active   cholecalciferol (VITAMIN D3 MAXIMUM STRENGTH) 5000 UNIT Cap UNK Active   dicyclomine (BENTYL) 10 MG Cap UNK Active   metoprolol SR (TOPROL XL) 50 MG TABLET SR 24 HR NOT STARTED Active   potassium chloride SA (KDUR) 20 MEQ Tab CR NOT TAKING Active                    ALLERGIES  No Known Allergies    PHYSICAL EXAM  VITAL SIGNS: BP (!) 168/105   Pulse (!) 109   Temp 37 °C (98.6 °F) (Axillary)   Resp 18   Ht 1.702 m (5' 7\")   Wt 88.1 kg (194 lb 3.6 oz)   SpO2 92%   BMI 30.42 kg/m²    General: Pleasant male alert oriented in no acute distress.  Head: Normocephalic atraumatic  Eyes: Extraocular motion intact  Neck: Supple, no rigidity  Cardiovascular: Tachycardic rate and rhythm systolic murmurs rubs no or gallops  Respiratory: Clear to auscultation bilaterally, equal chest rise and fall, no increased work of breathing  Abdomen: Soft nontender no guarding  Musculoskeletal: Warm and well perfused, trace bilateral peripheral edema  Neuro: Alert, no focal deficits.  Mild tongue fasciculations, mild peripheral tremors.  Integumentary: No wounds or rashes      DIAGNOSTIC STUDIES / PROCEDURES  EKG  I have independently interpreted this EKG  Results for orders placed or performed during the hospital encounter of 10/02/23   EKG   Result Value Ref Range    Report       [facility] Emergency Dept.    Test Date:  2023-10-02  Pt Name:    JOSE E BONNER           Department: ER  MRN:        [id number]                      Room:  Gender:     Male                         Technician: [id number]  :        1960                   Requested By:ER TRIAGE PROTOCOL  Order #:    [id number]                    Reading MD: Grey Stark    Measurements  Intervals                                Axis  Rate:       114            "               P:          52  PA:         152                          QRS:        10  QRSD:       105                          T:          0  QT:         358  QTc:        494    Interpretive Statements  Sinus tachycardia, rate of 114, normal axis, normal intervals outside of  borderline  Prolonged QT, isolated borderline elevation in aVR, V1, diffuse ST  depressions in the inferolateral leads.  Electronically Signed On 10- 16:13:22 PDT by Grey Perez     EKG   Result Value Ref Range    Report       [facility] Emergency Dept.    Test Date:  2023-10-02  Pt Name:    JOSE E BONNER           Department: ER  MRN:        [id number]                      Room:       [facility]  Gender:     Male                         Technician: [id number]  :        1960                   Requested By:GREY PEREZ  Order #:    [id number]                    Reading MD:    Measurements  Intervals                                Axis  Rate:       101                          P:          44  PA:         163                          QRS:        1  QRSD:       109                          T:          0  QT:         403  QTc:        523    Interpretive Statements  Sinus tachycardia  Probable left atrial enlargement  Left ventricular hypertrophy  Prolonged QT interval  Compared to ECG 10/02/2023 15:48:30  Left ventricular hypertrophy now present  ST (T wave) deviation no longer present           LABS      Significant elevated troponin, transaminase elevation, hypomagnesemia.      RADIOLOGY    DX-CHEST-PORTABLE (1 VIEW)   Final Result      No acute cardiac or pulmonary abnormalities are identified.      EC-ECHOCARDIOGRAM COMPLETE W/O CONT    (Results Pending)         COURSE & MEDICAL DECISION MAKING    ED Observation Status? No      INITIAL ASSESSMENT, COURSE AND PLAN  Care Narrative: 62-year-old male presenting with abnormal EKG, vital signs from clinic, he states he has not had any medical care for significant  amount of time.  His vital signs are notable for tachycardia, hypertension, and intermittent prolonged hypoxia, I personally was in the room with him where his oxygen level was in the high 80s on room air for several minutes at a time.      On exam he has trace pitting edema, and systolic murmur, he also has tongue fasciculations and peripheral tremors.     Broad differential clues not limited to: Hypertensive emergency,  unstable angina, ACS, alcohol withdrawal, renal dysfunction, will obtain labs, EKG, imaging.    His EKG is concerning for ischemic pattern, also noted prolonged QT in the setting of hypomagnesemia.  He was given magnesium, and benzodiazepines with improvement of his vital signs, patient has multiple risk factors for ACS including tobacco history, family history, hypertension, with ischemic changes.  Patient would benefit from admission for expedited cardiac work-up, and treatment of alcohol withdrawal.           DISPOSITION AND DISCUSSIONS    Inpatient for cardiac risk stratification, given EKG findings concerning for triple-vessel disease or  left coronary stenosis, elevated heart score, elevated troponin on initial presentation, as well as treatment for alcohol withdrawal.      I have discussed management of the patient with the following physicians and KENNETH's:  Dr Pugh    Discussion of management with other [facility] or appropriate source(s): Behavioral Health resources for withdrawal      Medications provided, barbiturates, benzodiazepines, antihypertensives, electrolytes    FINAL DIAGNOSIS  1. Hypertensive urgency    2. EKG abnormalities    3. Angina pectoris (HCC)    4. Acute respiratory failure with hypoxia (HCC)           Electronically signed by: Grey Stark M.D., 10/2/2023 4:04 PM

## 2023-10-03 ENCOUNTER — PHARMACY VISIT (OUTPATIENT)
Dept: PHARMACY | Facility: MEDICAL CENTER | Age: 63
End: 2023-10-03
Payer: COMMERCIAL

## 2023-10-03 ENCOUNTER — APPOINTMENT (OUTPATIENT)
Dept: CARDIOLOGY | Facility: MEDICAL CENTER | Age: 63
DRG: 897 | End: 2023-10-03
Attending: STUDENT IN AN ORGANIZED HEALTH CARE EDUCATION/TRAINING PROGRAM
Payer: COMMERCIAL

## 2023-10-03 VITALS
HEIGHT: 67 IN | TEMPERATURE: 97.9 F | OXYGEN SATURATION: 92 % | SYSTOLIC BLOOD PRESSURE: 132 MMHG | DIASTOLIC BLOOD PRESSURE: 49 MMHG | RESPIRATION RATE: 18 BRPM | BODY MASS INDEX: 30.9 KG/M2 | WEIGHT: 196.87 LBS | HEART RATE: 89 BPM

## 2023-10-03 PROBLEM — E83.42 HYPOMAGNESEMIA: Status: RESOLVED | Noted: 2023-10-02 | Resolved: 2023-10-03

## 2023-10-03 PROBLEM — F10.931 ALCOHOL WITHDRAWAL DELIRIUM, ACUTE, HYPERACTIVE (HCC): Status: RESOLVED | Noted: 2023-10-02 | Resolved: 2023-10-03

## 2023-10-03 LAB
ALBUMIN SERPL BCP-MCNC: 3.9 G/DL (ref 3.2–4.9)
ALBUMIN/GLOB SERPL: 1.3 G/DL
ALP SERPL-CCNC: 110 U/L (ref 30–99)
ALT SERPL-CCNC: 86 U/L (ref 2–50)
AMPHET UR QL SCN: NEGATIVE
ANION GAP SERPL CALC-SCNC: 10 MMOL/L (ref 7–16)
APPEARANCE UR: CLEAR
AST SERPL-CCNC: 98 U/L (ref 12–45)
BARBITURATES UR QL SCN: POSITIVE
BASOPHILS # BLD AUTO: 0.5 % (ref 0–1.8)
BASOPHILS # BLD: 0.04 K/UL (ref 0–0.12)
BENZODIAZ UR QL SCN: NEGATIVE
BILIRUB SERPL-MCNC: 1.3 MG/DL (ref 0.1–1.5)
BILIRUB UR QL STRIP.AUTO: NEGATIVE
BUN SERPL-MCNC: 9 MG/DL (ref 8–22)
BZE UR QL SCN: NEGATIVE
CALCIUM ALBUM COR SERPL-MCNC: 8.9 MG/DL (ref 8.5–10.5)
CALCIUM SERPL-MCNC: 8.8 MG/DL (ref 8.5–10.5)
CANNABINOIDS UR QL SCN: NEGATIVE
CHLORIDE SERPL-SCNC: 103 MMOL/L (ref 96–112)
CO2 SERPL-SCNC: 23 MMOL/L (ref 20–33)
COLOR UR: ABNORMAL
CREAT SERPL-MCNC: 0.61 MG/DL (ref 0.5–1.4)
EOSINOPHIL # BLD AUTO: 0.14 K/UL (ref 0–0.51)
EOSINOPHIL NFR BLD: 1.7 % (ref 0–6.9)
ERYTHROCYTE [DISTWIDTH] IN BLOOD BY AUTOMATED COUNT: 45.2 FL (ref 35.9–50)
FENTANYL UR QL: NEGATIVE
GFR SERPLBLD CREATININE-BSD FMLA CKD-EPI: 109 ML/MIN/1.73 M 2
GLOBULIN SER CALC-MCNC: 3.1 G/DL (ref 1.9–3.5)
GLUCOSE SERPL-MCNC: 198 MG/DL (ref 65–99)
GLUCOSE UR STRIP.AUTO-MCNC: NEGATIVE MG/DL
HAV IGM SERPL QL IA: NORMAL
HBV CORE IGM SER QL: NORMAL
HBV SURFACE AG SER QL: NORMAL
HCT VFR BLD AUTO: 42.5 % (ref 42–52)
HCV AB SER QL: NORMAL
HGB BLD-MCNC: 15.3 G/DL (ref 14–18)
IMM GRANULOCYTES # BLD AUTO: 0.04 K/UL (ref 0–0.11)
IMM GRANULOCYTES NFR BLD AUTO: 0.5 % (ref 0–0.9)
KETONES UR STRIP.AUTO-MCNC: ABNORMAL MG/DL
LEUKOCYTE ESTERASE UR QL STRIP.AUTO: NEGATIVE
LV EJECT FRACT MOD 2C 99903: 53.92
LV EJECT FRACT MOD 4C 99902: 63.78
LV EJECT FRACT MOD BP 99901: 59.29
LYMPHOCYTES # BLD AUTO: 1.1 K/UL (ref 1–4.8)
LYMPHOCYTES NFR BLD: 13.5 % (ref 22–41)
MAGNESIUM SERPL-MCNC: 2.7 MG/DL (ref 1.5–2.5)
MCH RBC QN AUTO: 35.2 PG (ref 27–33)
MCHC RBC AUTO-ENTMCNC: 36 G/DL (ref 32.3–36.5)
MCV RBC AUTO: 97.7 FL (ref 81.4–97.8)
METHADONE UR QL SCN: NEGATIVE
MICRO URNS: ABNORMAL
MONOCYTES # BLD AUTO: 0.76 K/UL (ref 0–0.85)
MONOCYTES NFR BLD AUTO: 9.3 % (ref 0–13.4)
NEUTROPHILS # BLD AUTO: 6.07 K/UL (ref 1.82–7.42)
NEUTROPHILS NFR BLD: 74.5 % (ref 44–72)
NITRITE UR QL STRIP.AUTO: NEGATIVE
NRBC # BLD AUTO: 0 K/UL
NRBC BLD-RTO: 0 /100 WBC (ref 0–0.2)
OPIATES UR QL SCN: NEGATIVE
OXYCODONE UR QL SCN: NEGATIVE
PCP UR QL SCN: NEGATIVE
PH UR STRIP.AUTO: 7.5 [PH] (ref 5–8)
PHOSPHATE SERPL-MCNC: 2 MG/DL (ref 2.5–4.5)
PLATELET # BLD AUTO: 118 K/UL (ref 164–446)
PMV BLD AUTO: 11.6 FL (ref 9–12.9)
POTASSIUM SERPL-SCNC: 3.9 MMOL/L (ref 3.6–5.5)
PROPOXYPH UR QL SCN: NEGATIVE
PROT SERPL-MCNC: 7 G/DL (ref 6–8.2)
PROT UR QL STRIP: NEGATIVE MG/DL
RBC # BLD AUTO: 4.35 M/UL (ref 4.7–6.1)
RBC UR QL AUTO: NEGATIVE
SODIUM SERPL-SCNC: 136 MMOL/L (ref 135–145)
SP GR UR STRIP.AUTO: 1.02
TROPONIN T SERPL-MCNC: 15 NG/L (ref 6–19)
UROBILINOGEN UR STRIP.AUTO-MCNC: 2 MG/DL
WBC # BLD AUTO: 8.2 K/UL (ref 4.8–10.8)

## 2023-10-03 PROCEDURE — 80053 COMPREHEN METABOLIC PANEL: CPT

## 2023-10-03 PROCEDURE — 700102 HCHG RX REV CODE 250 W/ 637 OVERRIDE(OP): Performed by: STUDENT IN AN ORGANIZED HEALTH CARE EDUCATION/TRAINING PROGRAM

## 2023-10-03 PROCEDURE — 84484 ASSAY OF TROPONIN QUANT: CPT

## 2023-10-03 PROCEDURE — 83735 ASSAY OF MAGNESIUM: CPT

## 2023-10-03 PROCEDURE — 80074 ACUTE HEPATITIS PANEL: CPT

## 2023-10-03 PROCEDURE — 99239 HOSP IP/OBS DSCHRG MGMT >30: CPT | Performed by: INTERNAL MEDICINE

## 2023-10-03 PROCEDURE — 81003 URINALYSIS AUTO W/O SCOPE: CPT

## 2023-10-03 PROCEDURE — 700111 HCHG RX REV CODE 636 W/ 250 OVERRIDE (IP): Performed by: STUDENT IN AN ORGANIZED HEALTH CARE EDUCATION/TRAINING PROGRAM

## 2023-10-03 PROCEDURE — C9113 INJ PANTOPRAZOLE SODIUM, VIA: HCPCS | Performed by: STUDENT IN AN ORGANIZED HEALTH CARE EDUCATION/TRAINING PROGRAM

## 2023-10-03 PROCEDURE — 84100 ASSAY OF PHOSPHORUS: CPT

## 2023-10-03 PROCEDURE — RXMED WILLOW AMBULATORY MEDICATION CHARGE: Performed by: INTERNAL MEDICINE

## 2023-10-03 PROCEDURE — 93306 TTE W/DOPPLER COMPLETE: CPT

## 2023-10-03 PROCEDURE — 80307 DRUG TEST PRSMV CHEM ANLYZR: CPT

## 2023-10-03 PROCEDURE — 85025 COMPLETE CBC W/AUTO DIFF WBC: CPT

## 2023-10-03 PROCEDURE — A9270 NON-COVERED ITEM OR SERVICE: HCPCS | Performed by: STUDENT IN AN ORGANIZED HEALTH CARE EDUCATION/TRAINING PROGRAM

## 2023-10-03 PROCEDURE — 93306 TTE W/DOPPLER COMPLETE: CPT | Mod: 26 | Performed by: STUDENT IN AN ORGANIZED HEALTH CARE EDUCATION/TRAINING PROGRAM

## 2023-10-03 RX ORDER — OMEPRAZOLE 20 MG/1
20 CAPSULE, DELAYED RELEASE ORAL 2 TIMES DAILY
Status: DISCONTINUED | OUTPATIENT
Start: 2023-10-03 | End: 2023-10-03 | Stop reason: HOSPADM

## 2023-10-03 RX ORDER — LANOLIN ALCOHOL/MO/W.PET/CERES
100 CREAM (GRAM) TOPICAL DAILY
Qty: 30 TABLET | Refills: 0 | Status: SHIPPED | OUTPATIENT
Start: 2023-10-04

## 2023-10-03 RX ADMIN — CHLORDIAZEPOXIDE HYDROCHLORIDE 50 MG: 25 CAPSULE ORAL at 12:31

## 2023-10-03 RX ADMIN — METOPROLOL SUCCINATE 50 MG: 50 TABLET, EXTENDED RELEASE ORAL at 05:27

## 2023-10-03 RX ADMIN — CHLORDIAZEPOXIDE HYDROCHLORIDE 50 MG: 25 CAPSULE ORAL at 05:26

## 2023-10-03 RX ADMIN — Medication 100 MG: at 05:27

## 2023-10-03 RX ADMIN — Medication 400 MG: at 05:27

## 2023-10-03 RX ADMIN — FOLIC ACID 1 MG: 1 TABLET ORAL at 05:27

## 2023-10-03 RX ADMIN — CLONIDINE HYDROCHLORIDE 0.1 MG: 0.1 TABLET ORAL at 05:27

## 2023-10-03 RX ADMIN — CHOLECALCIFEROL TAB 125 MCG (5000 UNIT) 5000 UNITS: 125 TAB at 05:27

## 2023-10-03 RX ADMIN — PANTOPRAZOLE SODIUM 40 MG: 40 INJECTION, POWDER, FOR SOLUTION INTRAVENOUS at 05:27

## 2023-10-03 RX ADMIN — THERA TABS 1 TABLET: TAB at 05:27

## 2023-10-03 RX ADMIN — AMLODIPINE BESYLATE 5 MG: 5 TABLET ORAL at 05:27

## 2023-10-03 ASSESSMENT — LIFESTYLE VARIABLES
ANXIETY: NO ANXIETY (AT EASE)
AGITATION: NORMAL ACTIVITY
PAROXYSMAL SWEATS: NO SWEAT VISIBLE
VISUAL DISTURBANCES: NOT PRESENT
TREMOR: NO TREMOR
ANXIETY: NO ANXIETY (AT EASE)
NAUSEA AND VOMITING: NO NAUSEA AND NO VOMITING
ANXIETY: NO ANXIETY (AT EASE)
VISUAL DISTURBANCES: NOT PRESENT
HEADACHE, FULLNESS IN HEAD: NOT PRESENT
NAUSEA AND VOMITING: NO NAUSEA AND NO VOMITING
ORIENTATION AND CLOUDING OF SENSORIUM: ORIENTED AND CAN DO SERIAL ADDITIONS
TOTAL SCORE: 1
ORIENTATION AND CLOUDING OF SENSORIUM: ORIENTED AND CAN DO SERIAL ADDITIONS
NAUSEA AND VOMITING: NO NAUSEA AND NO VOMITING
AUDITORY DISTURBANCES: NOT PRESENT
HEADACHE, FULLNESS IN HEAD: NOT PRESENT
TREMOR: NO TREMOR
TOTAL SCORE: 1
ORIENTATION AND CLOUDING OF SENSORIUM: ORIENTED AND CAN DO SERIAL ADDITIONS
PAROXYSMAL SWEATS: BARELY PERCEPTIBLE SWEATING. PALMS MOIST
PAROXYSMAL SWEATS: BARELY PERCEPTIBLE SWEATING. PALMS MOIST
AUDITORY DISTURBANCES: NOT PRESENT
HEADACHE, FULLNESS IN HEAD: NOT PRESENT
AGITATION: NORMAL ACTIVITY
TOTAL SCORE: 0
TREMOR: NO TREMOR
VISUAL DISTURBANCES: NOT PRESENT
AGITATION: NORMAL ACTIVITY
AUDITORY DISTURBANCES: NOT PRESENT

## 2023-10-03 ASSESSMENT — FIBROSIS 4 INDEX: FIB4 SCORE: 5.46

## 2023-10-03 ASSESSMENT — PAIN DESCRIPTION - PAIN TYPE: TYPE: ACUTE PAIN

## 2023-10-03 NOTE — ED NOTES
Assist RN: Pt medicated per MAR. Placed on 2L NC connected to wall. Pt resting, nad, on all monitoring, call light within reach.

## 2023-10-03 NOTE — ED NOTES
Bedside report to Angella ZUÑIGA. Pt on [facility], 2 LPM via nasal cannula, and continuous cardiac monitoring. All belongings and chart transported upstairs with pt.

## 2023-10-03 NOTE — DISCHARGE SUMMARY
Discharge Summary    CHIEF COMPLAINT ON ADMISSION  Chief Complaint   Patient presents with    Sent by MD     Pt was at his PCP and sent here for abnormal EKG.        Reason for Admission  Chest Pain     Admission Date  10/2/2023    CODE STATUS  Full Code    HPI & HOSPITAL COURSE    Morales Lopez is a 62 y.o. male with history of alcohol abuse, hypertension who presented 10/2/2023 with evaluation for uncontrolled hypertension.  Patient was seen by outpatient provider, noted to have SBP above 200, referred to ER for further evaluation.  He admits to consuming at least more than 750 cc bottle of bourbon daily, reported last drink was yesterday.  In ER, noted to have sinus tachycardia with nonspecific ST changes on EKG.  Notable elevated LFTs, magnesium 1.4.  He does have initial troponin of 41, improved to 13 on repeat draw.  Admission requested by ERP for further evaluation and treatment.    He was admitted to the hospital and he was placed on CIWA protocol.  On presentation he found to have hypomagnesemia and received magnesium replacement.  He found to have slight elevated troponin and repeat troponin came back within normal limits.    He also found to have hypertensive urgency on admission and is home medications were continued and at the time of evaluation his blood pressure has significantly improved.    Today I evaluated and examined him at the bedside.  He does not show symptoms of alcohol withdrawal.  I discussed plan of care with him that it is very important not to consume alcohol and he expressed understanding.  Patient's friend and son at the bedside I obtained permission from the patient and he gave consent to discuss plan of care in front of them.  He also underwent echocardiogram that did not show any significant acute abnormalities.  He found to have EF of 60%, mild concentric left ventricular hypertrophy.  The right ventricle is dilated.  Normal right ventricle systolic function.  Mild  mitral regurgitation.  RVSP of 35 mmHg    I strongly recommended him to check his blood pressure twice a day and make a blood pressure log and bring to his primary care provider appointment and he expressed understanding.      Therefore, he is discharged in fair and stable condition to home with close outpatient follow-up.    The patient recovered much more quickly than anticipated on admission.    Discharge Date  10/03/23      FOLLOW UP ITEMS POST DISCHARGE  Primary care provider    DISCHARGE DIAGNOSES  Principal Problem (Resolved):    Alcohol withdrawal delirium, acute, hyperactive (HCC) (POA: Yes)  Active Problems:    Mixed hyperlipidemia (POA: Yes)    Elevated LFTs (POA: Yes)    Hypertensive urgency (POA: Unknown)    Tobacco abuse (POA: Unknown)  Resolved Problems:    Hypomagnesemia (POA: Unknown)      FOLLOW UP    Elliot Calvin M.D.  [facility] [facility] [address]  [place] [id number] [phone]    Go to  As needed      MEDICATIONS ON DISCHARGE     Medication List        START taking these medications        Instructions   thiamine 100 MG tablet  Start taking on: October 4, 2023  Commonly known as: Thiamine   Take 1 Tablet by mouth every day.  Dose: 100 mg            CONTINUE taking these medications        Instructions   amLODIPine 5 MG Tabs  Commonly known as: Norvasc   Take 1 Tablet by mouth every day.  Dose: 5 mg     dicyclomine 10 MG Caps  Commonly known as: Bentyl   TAKE 1 CAPSULE BY MOUTH 4 TIMES A DAY,BEFORE MEALS AND AT BEDTIME.  Dose: 10 mg     metoprolol SR 50 MG Tb24  Commonly known as: Toprol XL   Take 1 Tablet by mouth every day.  Dose: 50 mg     multivitamin Tabs   Take 1 Tablet by mouth every day.  Dose: 1 Tablet     potassium chloride SA 20 MEQ Tbcr  Commonly known as: Kdur   Take 1 Tablet by mouth 2 times a day.  Dose: 20 mEq     Vitamin D3 Maximum Strength 5000 UNIT Caps  Generic drug: cholecalciferol   Take 1 Capsule by mouth every day.  Dose: 5,000 Units              Allergies  No  Known Allergies    DIET  Orders Placed This Encounter   Procedures    Diet Order Diet: Cardiac     Standing Status:   Standing     Number of Occurrences:   1     Order Specific Question:   Diet:     Answer:   Cardiac [6]       ACTIVITY  As tolerated.  Weight bearing as tolerated    CONSULTATIONS  None    PROCEDURES  None    LABORATORY  Lab Results   Component Value Date    SODIUM 136 10/03/2023    POTASSIUM 3.9 10/03/2023    CHLORIDE 103 10/03/2023    CO2 23 10/03/2023    GLUCOSE 198 (H) 10/03/2023    BUN 9 10/03/2023    CREATININE 0.61 10/03/2023        Lab Results   Component Value Date    WBC 8.2 10/03/2023    HEMOGLOBIN 15.3 10/03/2023    HEMATOCRIT 42.5 10/03/2023    PLATELETCT 118 (L) 10/03/2023      Echocardiography Laboratory     CONCLUSIONS  Normal left ventricular systolic function.  The left ventricular   ejection fraction is visually estimated to be 60%.   Mild concentric left ventricular hypertrophy.   The right ventricle is dilated. Normal right ventricular systolic   function.   Mild mitral regurgitation.  Estimated right ventricular systolic pressure is 35 mmHg (normal).   No prior study is available for comparison.     Total time of the discharge process exceeds 36 minutes.

## 2023-10-03 NOTE — DISCHARGE PLANNING
"Case Management Discharge Planning    Admission Date: 10/2/2023  GMLOS: 3.3  ALOS: 1    6-Clicks ADL Score: 24  6-Clicks Mobility Score: 24      Anticipated Discharge Dispo: Discharge Disposition: Discharged to home/self care (01)    DME Needed: No    Action(s) Taken: DC Assessment Complete (See below); patient provided list of area resources for drug and alcohol abuse, cessation, and rehab.    Escalations Completed: None    Medically Clear: No    Next Steps: CM to follow up with IDT regarding discharge needs/barriers.    Barriers to Discharge: Medical clearance    Is the patient up for discharge tomorrow: No; discharge today.       Care Transition Team Assessment    Case management role discussed; patient verbalized understanding of role. Demographics verified. Patient is a 62 year old male with a past medical hx of ETOH abuse and hypertension. Patient was seen at his PCP and sent here for an abnormal EKG. Patient states he lives alone in a one story house and was independent with ADLS/IADLS prior to admission. Patient feels he has adequate support at home and states he is \"motivated not to have another drink\".     Information Source  Orientation Level: Oriented X4  Information Given By: Patient  Informant's Name: Morales  Who is responsible for making decisions for patient? : Patient    Readmission Evaluation  Is this a readmission?: No    Elopement Risk  Legal Hold: No  Ambulatory or Self Mobile in Wheelchair: Yes  Disoriented: No  Psychiatric Symptoms: None  History of Wandering: No  Elopement this Admit: No  Vocalizing Wanting to Leave: No  Displays Behaviors, Body Language Wanting to Leave: No-Not at Risk for Elopement  Elopement Risk: Not at Risk for Elopement    Interdisciplinary Discharge Planning  Does Admitting Nurse Feel This Could be a Complex Discharge?: No  Primary Care Physician: Elliot Calvin  Lives with - Patient's Self Care Capacity: Alone and Able to Care For Self  Patient or legal guardian " "wants to designate a caregiver: No  Support Systems: Children, Spouse / Significant Other  Housing / Facility: [address]  Do You Take your Prescribed Medications Regularly: Yes  Able to Return to Previous ADL's: Yes  Mobility Issues: No  Prior Services: Home-Independent  Patient Prefers to be Discharged to:: home  Assistance Needed: No  Durable Medical Equipment: Not Applicable    Discharge Preparedness  What is your plan after discharge?: Home with help  What are your discharge supports?: Child, Other (comment) (Son-Oswaldo; Girlfriend-Yarely)  Prior Functional Level: Ambulatory, Independent with Activities of Daily Living, Independent with Medication Management  Difficulity with ADLs: None  Difficulity with IADLs: None    Functional Assesment  Prior Functional Level: Ambulatory, Independent with Activities of Daily Living, Independent with Medication Management    Finances  Financial Barriers to Discharge: No  Prescription Coverage: Yes    Vision / Hearing Impairment  Vision Impairment : No  Hearing Impairment : No  Hearing Impairment: Both Ears, Hearing Device Not Available  Does Pt Need Special Equipment for the Hearing Impaired?: No    Values / Beliefs / Concerns  Values / Beliefs Concerns : No    Advance Directive  Advance Directive?: None  Advance Directive offered?: AD Booklet given    Domestic Abuse  Have you ever been the victim of abuse or violence?: No  Physical Abuse or Sexual Abuse: No  Verbal Abuse or Emotional Abuse: No  Possible Abuse/Neglect Reported to:: Not Applicable    Psychological Assessment  History of Substance Abuse: Alcohol, Cocaine  Date Last Used - Alcohol: 10/01/2023  Date Last Used - Cocaine: \"Years ago\"  Substance Abuse Comments: RNCM provided list of area resources for drug and alcohol abuse  History of Psychiatric Problems: No  Non-compliant with Treatment: No  Newly Diagnosed Illness: No    Discharge Risks or Barriers  Discharge risks or barriers?: Substance abuse  Patient risk " factors: Substance abuse    Anticipated Discharge Information  Discharge Disposition: Discharged to home/self care (01)

## 2023-10-03 NOTE — CONSULTS
ED Consult cancelled. Discussed OP alcohol dependency resources in the community and provided printed resource list of providers.

## 2023-10-03 NOTE — H&P
Hospital Medicine History & Physical Note    Date of Service  10/2/2023    Primary Care Physician  Elliot Calvin M.D.    Consultants  None    Code Status  Full Code    Chief Complaint  Chief Complaint   Patient presents with    Sent by MD     Pt was at his PCP and sent here for abnormal EKG.        History of Presenting Illness  Morales Lopez is a 62 y.o. male with history of alcohol abuse, hypertension who presented 10/2/2023 with evaluation for uncontrolled hypertension.  Patient was seen by outpatient provider, noted to have SBP above 200, referred to ER for further evaluation.  He admits to consuming at least more than 750 cc bottle of bourbon daily, reported last drink was yesterday.  In ER, noted to have sinus tachycardia with nonspecific ST changes on EKG.  Notable elevated LFTs, magnesium 1.4.  He does have initial troponin of 41, improved to 13 on repeat draw.  Admission requested by ERP for further evaluation and treatment.    I discussed the plan of care with patient and bedside RN.    Review of Systems  Review of Systems   Constitutional:  Negative for chills and fever.   HENT:  Negative for hearing loss.    Eyes:  Negative for blurred vision and double vision.   Respiratory:  Negative for cough and shortness of breath.    Cardiovascular:  Positive for palpitations. Negative for chest pain and leg swelling.   Gastrointestinal:  Positive for abdominal pain, heartburn, nausea and vomiting.   Genitourinary:  Negative for dysuria and urgency.   Musculoskeletal:  Negative for joint pain and myalgias.   Skin:  Negative for itching and rash.   Neurological:  Positive for tremors. Negative for dizziness and headaches.   Endo/Heme/Allergies:  Negative for environmental allergies. Does not bruise/bleed easily.   Psychiatric/Behavioral:  Negative for depression and substance abuse.    All other systems reviewed and are negative.      Past Medical History   has a past medical history of  Snoring.    Surgical History   has a past surgical history that includes clavicle distal excision; laryngoscopy (1/25/2011); and vocal cord polyp excision (1/25/2011).     Family History  family history is not on file.   Family history reviewed with patient. There is family history that is pertinent to the chief complaint.   FH of CAD    Social History   reports that he has been smoking. He has never used smokeless tobacco. He reports current alcohol use of about 14.4 oz of alcohol per week. He reports that he does not use drugs.    Allergies  No Known Allergies    Medications  Prior to Admission Medications   Prescriptions Last Dose Informant Patient Reported? Taking?   amLODIPine (NORVASC) 5 MG Tab   No No   Sig: Take 1 Tablet by mouth every day.   cholecalciferol (VITAMIN D3 MAXIMUM STRENGTH) 5000 UNIT Cap   No No   Sig: Take 1 Capsule by mouth every day.   dicyclomine (BENTYL) 10 MG Cap   No No   Sig: TAKE 1 CAPSULE BY MOUTH 4 TIMES A DAY,BEFORE MEALS AND AT BEDTIME.   metoprolol SR (TOPROL XL) 50 MG TABLET SR 24 HR   No No   Sig: Take 1 Tablet by mouth every day.   potassium chloride SA (KDUR) 20 MEQ Tab CR   No No   Sig: Take 1 Tablet by mouth 2 times a day.      Facility-Administered Medications: None       Physical Exam  Temp:  [36.7 °C (98.1 °F)-37.4 °C (99.4 °F)] 36.7 °C (98.1 °F)  Pulse:  [] 99  Resp:  [14-23] 18  BP: (137-210)/() 157/94  SpO2:  [89 %-96 %] 94 %  Blood Pressure: (!) 177/75   Temperature: 36.8 °C (98.2 °F)   Pulse: 100   Respiration: 20   Pulse Oximetry: 94 %       Physical Exam  Vitals and nursing note reviewed.   Constitutional:       General: He is not in acute distress.  HENT:      Head: Normocephalic and atraumatic.      Nose: Nose normal.      Mouth/Throat:      Mouth: Mucous membranes are dry.      Pharynx: Oropharynx is clear.   Eyes:      General: No scleral icterus.     Extraocular Movements: Extraocular movements intact.   Cardiovascular:      Rate and Rhythm:  Regular rhythm. Tachycardia present.      Pulses: Normal pulses.      Heart sounds:      No friction rub.   Pulmonary:      Effort: No respiratory distress.      Breath sounds: No wheezing or rales.   Chest:      Chest wall: No tenderness.   Abdominal:      General: There is distension.      Tenderness: There is no abdominal tenderness. There is no guarding or rebound.   Musculoskeletal:         General: No tenderness. Normal range of motion.      Cervical back: Neck supple. No tenderness.   Skin:     General: Skin is warm and dry.      Capillary Refill: Capillary refill takes less than 2 seconds.   Neurological:      General: No focal deficit present.      Mental Status: He is alert and oriented to person, place, and time.   Psychiatric:         Mood and Affect: Mood normal.         Laboratory:  Recent Labs     10/02/23  1607   WBC 9.7   RBC 4.72   HEMOGLOBIN 17.3   HEMATOCRIT 46.1   MCV 97.7   MCH 36.7*   MCHC 37.5*   RDW 45.6   PLATELETCT 131*   MPV 11.5     Recent Labs     10/02/23  1607   SODIUM 142   POTASSIUM 3.7   CHLORIDE 105   CO2 23   GLUCOSE 95   BUN 8   CREATININE 0.61   CALCIUM 9.4     Recent Labs     10/02/23  1607   ALTSGPT 110*   ASTSGOT 145*   ALKPHOSPHAT 129*   TBILIRUBIN 1.4   GLUCOSE 95     Recent Labs     10/02/23  1607   INR 1.16*     Recent Labs     10/02/23  1607   NTPROBNP 65         Recent Labs     10/02/23  1607 10/02/23  1814   TROPONINT 101* 13       Imaging:  DX-CHEST-PORTABLE (1 VIEW)   Final Result      No acute cardiac or pulmonary abnormalities are identified.      EC-ECHOCARDIOGRAM COMPLETE W/O CONT    (Results Pending)       X-Ray:  I have personally reviewed the images and compared with prior images.  EKG:  I have personally reviewed the images and compared with prior images.    Assessment/Plan:  Justification for Admission Status  I anticipate this patient will require at least 2 midnights hospitalization, therefore appropriate for inpatient status.        * Alcohol withdrawal  delirium, acute, hyperactive (HCC)- (present on admission)  Assessment & Plan  Alcohol cessation advised  Detox bag, multivitamins  Scheduled Librium  [facility]    Hypomagnesemia  Assessment & Plan  Replace as appropriate    Hypertensive urgency  Assessment & Plan  SBP above 180 in ER  Continue home amlodipine, metoprolol  Treat alcohol withdrawal per [facility] protocol  As needed labetalol, clonidine    Elevated LFTs- (present on admission)  Assessment & Plan  Likely secondary to EtOH    Mixed hyperlipidemia- (present on admission)  Assessment & Plan  Initiate statin once LFTs improved    Tobacco abuse  Assessment & Plan  Reported chewing tobacco  Tobacco cessation counseling provided: 5 minutes  Declined offer nicotine patch, nicotine gum, Chantix as alternative.  Provided tobacco cessation information per protocol        VTE prophylaxis: enoxaparin ppx

## 2023-10-03 NOTE — DISCHARGE PLANNING
Alert Team:    Spoke with patient and friend at bedside; provided printed resource list and highlighted MAT programs and providers that will will accept sliding fee scales for uninsured. Discussed benefit of inpt alcohol detox d/t his daily alcohol consumption x 40 years and high risk of severe alcohol withdrawal. Discussed Peer recovery/Track-B support, harm reduction and AA Meetings. Discussed value of out of pocket expense for voluntary self-pay admission to [facility] for inpt alcohol detox. All questions answered.

## 2023-10-03 NOTE — PROGRESS NOTES
Assumed care of patient, bedside report received from Evert ALATORRE RN. Updated on plan of care, call light within reach and fall precautions are in place and bed lock and in lowest position. Patient instructed to call for assistance before getting out of bed. All questions answered at this time. No other needs.

## 2023-10-03 NOTE — PROGRESS NOTES
4 Eyes Skin Assessment Completed by ZARA Perales and ZARA Bennett.    Head WDL  Ears Redness and Blanching  Nose Redness  Mouth WDL  Neck WDL  Breast/Chest WDL  Shoulder Blades scar  Spine Redness  (R) Arm/Elbow/Hand WDL  (L) Arm/Elbow/Hand WDL  Abdomen WDL  Groin WDL  Scrotum/Coccyx/Buttocks WDL  (R) Leg WDL  (L) Leg WDL  (R) Heel/Foot/Toe WDL  (L) Heel/Foot/Toe WDL          Devices In Places Tele Box, Blood Pressure Cuff, Pulse Ox, and Nasal Cannula      Interventions In Place Gray Ear Foams and Pressure Redistribution Mattress    Possible Skin Injury No    Pictures Uploaded Into Epic N/A  Wound Consult Placed N/A  RN Wound Prevention Protocol Ordered No

## 2023-10-03 NOTE — ED NOTES
Bedside report from Nisha ZUÑIGA. Pt on tuckerrney in lowest, locked position, on room air, and continuous cardiac monitoring. Fall precautions in place, including fall risk sign and wrist band. Pt updated on plan of care. No needs at this time. Call light within reach.

## 2023-10-03 NOTE — ASSESSMENT & PLAN NOTE
Reported chewing tobacco  Tobacco cessation counseling provided: 5 minutes  Declined offer nicotine patch, nicotine gum, Chantix as alternative.  Provided tobacco cessation information per protocol

## 2023-10-03 NOTE — PROGRESS NOTES
Discharge instructions reviewed and sifned, all questions answered, no PIV, awaiting Meds to Beds.

## 2023-10-03 NOTE — ASSESSMENT & PLAN NOTE
SBP above 180 in ER  Continue home amlodipine, metoprolol  Treat alcohol withdrawal per [facility] protocol  As needed labetalol, clonidine

## 2023-10-03 NOTE — CARE PLAN
Problem: Knowledge Deficit - Standard  Goal: Patient and family/care givers will demonstrate understanding of plan of care, disease process/condition, diagnostic tests and medications  Outcome: Progressing  Note: Patient is axo x4. Patient has been updated on plan of care by admitting provider.      Problem: Optimal Care for Alcohol Withdrawal  Goal: Optimal Care for the alcohol withdrawal patient  Outcome: Progressing     Problem: Seizure Precautions  Goal: Implementation of seizure precautions  Outcome: Progressing  Note: Precautions in place.     Problem: Lifestyle Changes  Goal: Patient's ability to identify lifestyle changes and available resources to help reduce recurrence of condition will improve  Outcome: Progressing  Note: Patient expresses want to quit drinking.      Problem: Risk for Aspiration  Goal: Patient's risk for aspiration will be absent or decrease  Outcome: Progressing     Problem: Fall Risk  Goal: Patient will remain free from falls  Outcome: Progressing   The patient is Stable - Low risk of patient condition declining or worsening    Shift Goals  Clinical Goals: monitor CIWA, monitor labs  Patient Goals: sleep  Family Goals: n/a    Progress made toward(s) clinical / shift goals:      Patient is not progressing towards the following goals:

## 2023-10-19 ENCOUNTER — HOSPITAL ENCOUNTER (OUTPATIENT)
Dept: LAB | Facility: MEDICAL CENTER | Age: 63
End: 2023-10-19
Attending: INTERNAL MEDICINE
Payer: COMMERCIAL

## 2023-10-19 ENCOUNTER — DOCUMENTATION (OUTPATIENT)
Dept: HEALTH INFORMATION MANAGEMENT | Facility: OTHER | Age: 63
End: 2023-10-19
Payer: COMMERCIAL

## 2023-10-19 LAB
ALBUMIN SERPL BCP-MCNC: 4.5 G/DL (ref 3.2–4.9)
ALBUMIN/GLOB SERPL: 1 G/DL
ALP SERPL-CCNC: 108 U/L (ref 30–99)
ALT SERPL-CCNC: 60 U/L (ref 2–50)
ANION GAP SERPL CALC-SCNC: 13 MMOL/L (ref 7–16)
AST SERPL-CCNC: 46 U/L (ref 12–45)
BASOPHILS # BLD AUTO: 1.1 % (ref 0–1.8)
BASOPHILS # BLD: 0.11 K/UL (ref 0–0.12)
BILIRUB SERPL-MCNC: 1.2 MG/DL (ref 0.1–1.5)
BUN SERPL-MCNC: 12 MG/DL (ref 8–22)
CALCIUM ALBUM COR SERPL-MCNC: 9.4 MG/DL (ref 8.5–10.5)
CALCIUM SERPL-MCNC: 9.8 MG/DL (ref 8.5–10.5)
CHLORIDE SERPL-SCNC: 98 MMOL/L (ref 96–112)
CO2 SERPL-SCNC: 25 MMOL/L (ref 20–33)
CREAT SERPL-MCNC: 0.67 MG/DL (ref 0.5–1.4)
EOSINOPHIL # BLD AUTO: 0.14 K/UL (ref 0–0.51)
EOSINOPHIL NFR BLD: 1.4 % (ref 0–6.9)
ERYTHROCYTE [DISTWIDTH] IN BLOOD BY AUTOMATED COUNT: 42.4 FL (ref 35.9–50)
FERRITIN SERPL-MCNC: 728 NG/ML (ref 22–322)
GFR SERPLBLD CREATININE-BSD FMLA CKD-EPI: 106 ML/MIN/1.73 M 2
GLOBULIN SER CALC-MCNC: 4.3 G/DL (ref 1.9–3.5)
GLUCOSE SERPL-MCNC: 94 MG/DL (ref 65–99)
HCT VFR BLD AUTO: 46.5 % (ref 42–52)
HGB BLD-MCNC: 16.3 G/DL (ref 14–18)
IMM GRANULOCYTES # BLD AUTO: 0.06 K/UL (ref 0–0.11)
IMM GRANULOCYTES NFR BLD AUTO: 0.6 % (ref 0–0.9)
IRON SATN MFR SERPL: 21 % (ref 15–55)
IRON SERPL-MCNC: 56 UG/DL (ref 50–180)
LYMPHOCYTES # BLD AUTO: 1.24 K/UL (ref 1–4.8)
LYMPHOCYTES NFR BLD: 12.1 % (ref 22–41)
MCH RBC QN AUTO: 33.8 PG (ref 27–33)
MCHC RBC AUTO-ENTMCNC: 35.1 G/DL (ref 32.3–36.5)
MCV RBC AUTO: 96.5 FL (ref 81.4–97.8)
MONOCYTES # BLD AUTO: 0.78 K/UL (ref 0–0.85)
MONOCYTES NFR BLD AUTO: 7.6 % (ref 0–13.4)
NEUTROPHILS # BLD AUTO: 7.92 K/UL (ref 1.82–7.42)
NEUTROPHILS NFR BLD: 77.2 % (ref 44–72)
NRBC # BLD AUTO: 0 K/UL
NRBC BLD-RTO: 0 /100 WBC (ref 0–0.2)
PLATELET # BLD AUTO: 236 K/UL (ref 164–446)
PMV BLD AUTO: 12 FL (ref 9–12.9)
POTASSIUM SERPL-SCNC: 4 MMOL/L (ref 3.6–5.5)
PROT SERPL-MCNC: 8.8 G/DL (ref 6–8.2)
RBC # BLD AUTO: 4.82 M/UL (ref 4.7–6.1)
SODIUM SERPL-SCNC: 136 MMOL/L (ref 135–145)
TIBC SERPL-MCNC: 266 UG/DL (ref 250–450)
UIBC SERPL-MCNC: 210 UG/DL (ref 110–370)
WBC # BLD AUTO: 10.3 K/UL (ref 4.8–10.8)

## 2023-10-19 PROCEDURE — 82465 ASSAY BLD/SERUM CHOLESTEROL: CPT

## 2023-10-19 PROCEDURE — 82390 ASSAY OF CERULOPLASMIN: CPT

## 2023-10-19 PROCEDURE — 85025 COMPLETE CBC W/AUTO DIFF WBC: CPT

## 2023-10-19 PROCEDURE — 82977 ASSAY OF GGT: CPT

## 2023-10-19 PROCEDURE — 80053 COMPREHEN METABOLIC PANEL: CPT

## 2023-10-19 PROCEDURE — 36415 COLL VENOUS BLD VENIPUNCTURE: CPT

## 2023-10-19 PROCEDURE — 83550 IRON BINDING TEST: CPT

## 2023-10-19 PROCEDURE — 82947 ASSAY GLUCOSE BLOOD QUANT: CPT

## 2023-10-19 PROCEDURE — 83540 ASSAY OF IRON: CPT

## 2023-10-19 PROCEDURE — 82728 ASSAY OF FERRITIN: CPT

## 2023-10-19 PROCEDURE — 83883 ASSAY NEPHELOMETRY NOT SPEC: CPT

## 2023-10-19 PROCEDURE — 84450 TRANSFERASE (AST) (SGOT): CPT

## 2023-10-19 PROCEDURE — 84478 ASSAY OF TRIGLYCERIDES: CPT

## 2023-10-19 PROCEDURE — 82172 ASSAY OF APOLIPOPROTEIN: CPT

## 2023-10-19 PROCEDURE — 83010 ASSAY OF HAPTOGLOBIN QUANT: CPT

## 2023-10-21 LAB — CERULOPLASMIN SERPL-MCNC: 35 MG/DL (ref 15–30)

## 2023-10-24 ENCOUNTER — APPOINTMENT (OUTPATIENT)
Dept: MEDICAL GROUP | Facility: PHYSICIAN GROUP | Age: 63
End: 2023-10-24
Payer: COMMERCIAL

## 2023-10-25 LAB
A2 MACROGLOB SERPL-MCNC: 186 MG/DL (ref 110–276)
ALT SERPL W P-5'-P-CCNC: 69 IU/L (ref 0–55)
APO A-I SERPL-MCNC: 119 MG/DL (ref 101–178)
AST SERPL W P-5'-P-CCNC: 46 IU/L (ref 0–40)
BILIRUB SERPL-MCNC: 0.9 MG/DL (ref 0–1.2)
CHOLEST SERPL-MCNC: 190 MG/DL (ref 100–199)
FIBROSIS SCORING 550107: ABNORMAL
FIBROSIS STAGE SERPL QL: ABNORMAL
GGT SERPL-CCNC: 91 IU/L (ref 0–65)
GLUCOSE SERPL-MCNC: 99 MG/DL (ref 70–99)
HAPTOGLOB SERPL-MCNC: 123 MG/DL (ref 32–363)
LABORATORY COMMENT REPORT: ABNORMAL
LIVER FIBR SCORE SERPL CALC.FIBROSURE: 0.56 (ref 0–0.21)
Lab: ABNORMAL
Lab: ABNORMAL
NECROINFLAMMATORY ACT GRADE SERPL QL: ABNORMAL
NECROINFLAMMATORY ACT SCORE SERPL: 0.02 (ref 0–17)
SERVICE CMNT-IMP: ABNORMAL
STEATOSIS GRADE Z4778: ABNORMAL
STEATOSIS GRADING Z4788: ABNORMAL
STEATOSIS SCORE Z4777: 0.76 (ref 0–0.3)
TEST PERFORMANCE INFO SPEC: ABNORMAL
TRIGL SERPL-MCNC: 91 MG/DL (ref 0–149)

## 2023-10-26 ENCOUNTER — OFFICE VISIT (OUTPATIENT)
Dept: MEDICAL GROUP | Facility: PHYSICIAN GROUP | Age: 63
End: 2023-10-26
Payer: COMMERCIAL

## 2023-10-26 VITALS
DIASTOLIC BLOOD PRESSURE: 80 MMHG | SYSTOLIC BLOOD PRESSURE: 130 MMHG | OXYGEN SATURATION: 96 % | TEMPERATURE: 97.9 F | HEIGHT: 68 IN | BODY MASS INDEX: 29.25 KG/M2 | RESPIRATION RATE: 14 BRPM | WEIGHT: 193 LBS | HEART RATE: 99 BPM

## 2023-10-26 DIAGNOSIS — I10 PRIMARY HYPERTENSION: ICD-10-CM

## 2023-10-26 DIAGNOSIS — R74.01 TRANSAMINITIS: ICD-10-CM

## 2023-10-26 DIAGNOSIS — F10.91 ALCOHOL USE DISORDER IN REMISSION: ICD-10-CM

## 2023-10-26 DIAGNOSIS — K63.5 POLYP OF COLON, UNSPECIFIED PART OF COLON, UNSPECIFIED TYPE: ICD-10-CM

## 2023-10-26 DIAGNOSIS — E78.2 MIXED HYPERLIPIDEMIA: ICD-10-CM

## 2023-10-26 PROBLEM — R63.0 POOR APPETITE: Status: RESOLVED | Noted: 2023-08-10 | Resolved: 2023-10-26

## 2023-10-26 PROBLEM — R11.0 NAUSEA: Status: RESOLVED | Noted: 2023-08-10 | Resolved: 2023-10-26

## 2023-10-26 PROBLEM — R14.0 BLOATED ABDOMEN: Status: RESOLVED | Noted: 2023-08-10 | Resolved: 2023-10-26

## 2023-10-26 PROBLEM — I16.0 HYPERTENSIVE URGENCY: Status: RESOLVED | Noted: 2023-10-02 | Resolved: 2023-10-26

## 2023-10-26 PROBLEM — R19.7 DIARRHEA: Status: RESOLVED | Noted: 2023-08-10 | Resolved: 2023-10-26

## 2023-10-26 PROBLEM — R79.89 ELEVATED LFTS: Status: RESOLVED | Noted: 2023-09-01 | Resolved: 2023-10-26

## 2023-10-26 PROBLEM — R53.83 OTHER FATIGUE: Status: RESOLVED | Noted: 2023-08-10 | Resolved: 2023-10-26

## 2023-10-26 PROCEDURE — 99214 OFFICE O/P EST MOD 30 MIN: CPT | Performed by: FAMILY MEDICINE

## 2023-10-26 PROCEDURE — 3075F SYST BP GE 130 - 139MM HG: CPT | Performed by: FAMILY MEDICINE

## 2023-10-26 PROCEDURE — 3079F DIAST BP 80-89 MM HG: CPT | Performed by: FAMILY MEDICINE

## 2023-10-26 RX ORDER — ROSUVASTATIN CALCIUM 5 MG/1
5 TABLET, COATED ORAL EVERY EVENING
Qty: 90 TABLET | Refills: 3 | Status: SHIPPED | OUTPATIENT
Start: 2023-10-26 | End: 2023-12-14 | Stop reason: SDUPTHER

## 2023-10-26 ASSESSMENT — FIBROSIS 4 INDEX: FIB4 SCORE: 1.43

## 2023-10-26 NOTE — PROGRESS NOTES
"CHIEF COMPLAINT / REASON FOR VISIT  Morales Lopez is a 62 y.o. male that presents today for follow up    HISTORY OF PRESENT ILLNESS  Alcohol use: stopped 23 days ago    Had EGD/colonoscopy done through GI consultants  Had 7 colon polyps, waiting on pathology  Also had esophageal biopsy due to abnormal appearance    OBJECTIVE     /80 (BP Location: Left arm, Patient Position: Sitting, BP Cuff Size: Adult)   Pulse 99   Temp 36.6 °C (97.9 °F) (Temporal)   Resp 14   Ht 1.727 m (5' 8\")   Wt 87.5 kg (193 lb)   SpO2 96%   BMI 29.35 kg/m²      PHYSICAL EXAM  Constitutional: Sitting comfortably, in no acute distress, responds to questions appropriately.  Eyes:  No conjunctival injection, no scleral icterus, PERRL  Ears: Normal tympanic membranes, external ear canals clear  Neck: No cervical lymphadenopathy  Mouth: oral mucosa moist  Throat: Oropharynx clear  Heart: Regular S1 S2, grade 2 systolic murmur left lower sternal border space with radiation to left axilla  Lungs: Clear to auscultation bilaterally, no wheezes, rales, or rhonchi  Extremities: No lower extremity edema  Skin: Warm and dry, no rashes or lesions on face or exposed upper extremities    ASSESSMENT & PLAN  1. Transaminitis  Transaminases are improving with continued abstinence from alcohol.  Obtain repeat CMP in 1 month for follow-up.  Had ARMANDO FibroSure blood test done which showed elevated fibrosis score (F2-bridging fibrosis with few septa) and steatosis score (S3 marked or severe steatosis).  Will obtain right upper quadrant ultrasound with liver elastography.  From his labs it appears that his liver function is still intact.  - Comp Metabolic Panel; Future  - US-ABDOMEN COMPLETE WITH ELASTOGRAPHY (COMBO); Future    2. Alcohol use disorder in remission  23 days sober, congratulated on continued sobriety.    3. Primary hypertension  Chronic, controlled with amlodipine 5 mg daily and metoprolol succinate 50 mg daily.    4. Mixed " hyperlipidemia  Chronic, Uncontrolled, after discussion decided to initiate rosuvastatin 5 mg daily for ASCVD risk reduction.  Goal LDL-C less than 100.    5. Polyp of colon, unspecified part of colon, unspecified type  Reportedly had several polyps on recent colonoscopy, awaiting results of pathology    Follow-up in 6 weeks, after ultrasound and labs

## 2023-12-11 ENCOUNTER — HOSPITAL ENCOUNTER (OUTPATIENT)
Dept: LAB | Facility: MEDICAL CENTER | Age: 63
End: 2023-12-11
Attending: FAMILY MEDICINE
Payer: COMMERCIAL

## 2023-12-11 ENCOUNTER — HOSPITAL ENCOUNTER (OUTPATIENT)
Dept: RADIOLOGY | Facility: MEDICAL CENTER | Age: 63
End: 2023-12-11
Attending: FAMILY MEDICINE
Payer: COMMERCIAL

## 2023-12-11 DIAGNOSIS — R74.01 TRANSAMINITIS: ICD-10-CM

## 2023-12-11 LAB
ALBUMIN SERPL BCP-MCNC: 4.3 G/DL (ref 3.2–4.9)
ALBUMIN/GLOB SERPL: 1.2 G/DL
ALP SERPL-CCNC: 103 U/L (ref 30–99)
ALT SERPL-CCNC: 21 U/L (ref 2–50)
ANION GAP SERPL CALC-SCNC: 12 MMOL/L (ref 7–16)
AST SERPL-CCNC: 15 U/L (ref 12–45)
BILIRUB SERPL-MCNC: 0.9 MG/DL (ref 0.1–1.5)
BUN SERPL-MCNC: 12 MG/DL (ref 8–22)
CALCIUM ALBUM COR SERPL-MCNC: 9.4 MG/DL (ref 8.5–10.5)
CALCIUM SERPL-MCNC: 9.6 MG/DL (ref 8.5–10.5)
CHLORIDE SERPL-SCNC: 101 MMOL/L (ref 96–112)
CO2 SERPL-SCNC: 24 MMOL/L (ref 20–33)
CREAT SERPL-MCNC: 0.74 MG/DL (ref 0.5–1.4)
GFR SERPLBLD CREATININE-BSD FMLA CKD-EPI: 102 ML/MIN/1.73 M 2
GLOBULIN SER CALC-MCNC: 3.7 G/DL (ref 1.9–3.5)
GLUCOSE SERPL-MCNC: 141 MG/DL (ref 65–99)
POTASSIUM SERPL-SCNC: 4.2 MMOL/L (ref 3.6–5.5)
PROT SERPL-MCNC: 8 G/DL (ref 6–8.2)
SODIUM SERPL-SCNC: 137 MMOL/L (ref 135–145)

## 2023-12-11 PROCEDURE — 76981 USE PARENCHYMA: CPT

## 2023-12-11 PROCEDURE — 80053 COMPREHEN METABOLIC PANEL: CPT

## 2023-12-11 PROCEDURE — 36415 COLL VENOUS BLD VENIPUNCTURE: CPT

## 2023-12-12 DIAGNOSIS — N28.89 OTHER SPECIFIED DISORDERS OF KIDNEY AND URETER: ICD-10-CM

## 2023-12-12 DIAGNOSIS — N28.89 RENAL MASS, LEFT: ICD-10-CM

## 2023-12-14 ENCOUNTER — OFFICE VISIT (OUTPATIENT)
Dept: MEDICAL GROUP | Facility: PHYSICIAN GROUP | Age: 63
End: 2023-12-14
Payer: COMMERCIAL

## 2023-12-14 VITALS
RESPIRATION RATE: 22 BRPM | HEIGHT: 68 IN | BODY MASS INDEX: 28.69 KG/M2 | HEART RATE: 87 BPM | DIASTOLIC BLOOD PRESSURE: 94 MMHG | OXYGEN SATURATION: 95 % | SYSTOLIC BLOOD PRESSURE: 138 MMHG | WEIGHT: 189.3 LBS | TEMPERATURE: 98.6 F

## 2023-12-14 DIAGNOSIS — E55.9 VITAMIN D DEFICIENCY: ICD-10-CM

## 2023-12-14 DIAGNOSIS — I10 PRIMARY HYPERTENSION: ICD-10-CM

## 2023-12-14 DIAGNOSIS — E87.6 HYPOKALEMIA: ICD-10-CM

## 2023-12-14 DIAGNOSIS — R00.0 TACHYCARDIA: ICD-10-CM

## 2023-12-14 DIAGNOSIS — E78.2 MIXED HYPERLIPIDEMIA: ICD-10-CM

## 2023-12-14 PROCEDURE — 99214 OFFICE O/P EST MOD 30 MIN: CPT | Performed by: FAMILY MEDICINE

## 2023-12-14 PROCEDURE — 3075F SYST BP GE 130 - 139MM HG: CPT | Performed by: FAMILY MEDICINE

## 2023-12-14 PROCEDURE — 3080F DIAST BP >= 90 MM HG: CPT | Performed by: FAMILY MEDICINE

## 2023-12-14 RX ORDER — AMLODIPINE BESYLATE 10 MG/1
10 TABLET ORAL DAILY
Qty: 90 TABLET | Refills: 3 | Status: SHIPPED | OUTPATIENT
Start: 2023-12-14

## 2023-12-14 RX ORDER — METOPROLOL SUCCINATE 50 MG/1
50 TABLET, EXTENDED RELEASE ORAL DAILY
Qty: 90 TABLET | Refills: 3 | Status: SHIPPED | OUTPATIENT
Start: 2023-12-14

## 2023-12-14 RX ORDER — AMLODIPINE BESYLATE 5 MG/1
5 TABLET ORAL DAILY
Qty: 90 TABLET | Refills: 3 | Status: SHIPPED | OUTPATIENT
Start: 2023-12-14 | End: 2023-12-14 | Stop reason: SDUPTHER

## 2023-12-14 RX ORDER — POTASSIUM CHLORIDE 20 MEQ/1
20 TABLET, EXTENDED RELEASE ORAL 2 TIMES DAILY
Qty: 180 TABLET | Refills: 3 | Status: SHIPPED | OUTPATIENT
Start: 2023-12-14

## 2023-12-14 RX ORDER — ROSUVASTATIN CALCIUM 5 MG/1
5 TABLET, COATED ORAL EVERY EVENING
Qty: 90 TABLET | Refills: 3 | Status: SHIPPED | OUTPATIENT
Start: 2023-12-14

## 2023-12-14 ASSESSMENT — FIBROSIS 4 INDEX: FIB4 SCORE: 0.86

## 2023-12-14 NOTE — PROGRESS NOTES
"CHIEF COMPLAINT / REASON FOR VISIT  Morales Lopez is a 63 y.o. male that presents today for follow-up    HISTORY OF PRESENT ILLNESS  He stopped drinking alcohol entirely.  Feeling well.  Checks blood pressure at home, sometimes in the 140s    OBJECTIVE     BP (!) 138/94 (BP Location: Left arm, Patient Position: Sitting, BP Cuff Size: Adult)   Pulse 87   Temp 37 °C (98.6 °F) (Temporal)   Resp (!) 22   Ht 1.727 m (5' 8\")   Wt 85.9 kg (189 lb 4.8 oz)   SpO2 95%   BMI 28.78 kg/m²      PHYSICAL EXAM  Constitutional: Sitting comfortably, in no acute distress, responds to questions appropriately.  Skin: Warm and dry, no rashes or lesions on face or exposed upper extremities    ASSESSMENT & PLAN  1. Primary hypertension  Chronic, uncontrolled, increase amlodipine to 10 mg daily, continue metoprolol succinate 50 mg daily.  Follow-up 6 months.  - metoprolol SR (TOPROL XL) 50 MG TABLET SR 24 HR; Take 1 Tablet by mouth every day.  Dispense: 90 Tablet; Refill: 3  - amLODIPine (NORVASC) 10 MG Tab; Take 1 Tablet by mouth every day.  Dispense: 90 Tablet; Refill: 3  - Comp Metabolic Panel; Future    2. Hypokalemia  - potassium chloride SA (KDUR) 20 MEQ Tab CR; Take 1 Tablet by mouth 2 times a day.  Dispense: 180 Tablet; Refill: 3  - Comp Metabolic Panel; Future    3. Mixed hyperlipidemia  Started rosuvastatin previously for hypercholesterolemia, repeat lipid panel prior to next visit  - rosuvastatin (CRESTOR) 5 MG Tab; Take 1 Tablet by mouth every evening.  Dispense: 90 Tablet; Refill: 3  - Lipid Profile; Future    4. Vitamin D deficiency  Recommend vitamin D3 5000 units daily.  - VITAMIN D,25 HYDROXY (DEFICIENCY); Future      "

## 2023-12-21 ENCOUNTER — HOSPITAL ENCOUNTER (OUTPATIENT)
Dept: RADIOLOGY | Facility: MEDICAL CENTER | Age: 63
End: 2023-12-21
Attending: FAMILY MEDICINE
Payer: COMMERCIAL

## 2023-12-21 DIAGNOSIS — N28.89 OTHER SPECIFIED DISORDERS OF KIDNEY AND URETER: ICD-10-CM

## 2023-12-21 DIAGNOSIS — N28.89 RENAL MASS, LEFT: ICD-10-CM

## 2023-12-21 PROCEDURE — 700117 HCHG RX CONTRAST REV CODE 255: Performed by: FAMILY MEDICINE

## 2023-12-21 PROCEDURE — 74170 CT ABD WO CNTRST FLWD CNTRST: CPT

## 2023-12-21 RX ADMIN — IOHEXOL 100 ML: 350 INJECTION, SOLUTION INTRAVENOUS at 09:00

## 2024-05-07 ENCOUNTER — DOCUMENTATION (OUTPATIENT)
Dept: HEALTH INFORMATION MANAGEMENT | Facility: OTHER | Age: 64
End: 2024-05-07
Payer: COMMERCIAL